# Patient Record
Sex: MALE | Race: WHITE | NOT HISPANIC OR LATINO | Employment: OTHER | URBAN - METROPOLITAN AREA
[De-identification: names, ages, dates, MRNs, and addresses within clinical notes are randomized per-mention and may not be internally consistent; named-entity substitution may affect disease eponyms.]

---

## 2017-02-17 ENCOUNTER — ALLSCRIPTS OFFICE VISIT (OUTPATIENT)
Dept: OTHER | Facility: OTHER | Age: 82
End: 2017-02-17

## 2017-07-05 ENCOUNTER — ALLSCRIPTS OFFICE VISIT (OUTPATIENT)
Dept: OTHER | Facility: OTHER | Age: 82
End: 2017-07-05

## 2017-07-17 ENCOUNTER — ALLSCRIPTS OFFICE VISIT (OUTPATIENT)
Dept: OTHER | Facility: OTHER | Age: 82
End: 2017-07-17

## 2017-07-17 ENCOUNTER — GENERIC CONVERSION - ENCOUNTER (OUTPATIENT)
Dept: OTHER | Facility: OTHER | Age: 82
End: 2017-07-17

## 2017-07-17 DIAGNOSIS — R42 DIZZINESS AND GIDDINESS: ICD-10-CM

## 2017-07-19 ENCOUNTER — APPOINTMENT (OUTPATIENT)
Dept: PHYSICAL THERAPY | Facility: CLINIC | Age: 82
End: 2017-07-19
Payer: COMMERCIAL

## 2017-07-19 DIAGNOSIS — R42 DIZZINESS AND GIDDINESS: ICD-10-CM

## 2017-07-19 PROCEDURE — 97162 PT EVAL MOD COMPLEX 30 MIN: CPT

## 2017-07-21 ENCOUNTER — APPOINTMENT (OUTPATIENT)
Dept: PHYSICAL THERAPY | Facility: CLINIC | Age: 82
End: 2017-07-21
Payer: COMMERCIAL

## 2017-07-21 PROCEDURE — 97112 NEUROMUSCULAR REEDUCATION: CPT

## 2017-07-24 ENCOUNTER — APPOINTMENT (OUTPATIENT)
Dept: PHYSICAL THERAPY | Facility: CLINIC | Age: 82
End: 2017-07-24
Payer: COMMERCIAL

## 2017-07-24 PROCEDURE — 97112 NEUROMUSCULAR REEDUCATION: CPT

## 2017-07-26 ENCOUNTER — ALLSCRIPTS OFFICE VISIT (OUTPATIENT)
Dept: OTHER | Facility: OTHER | Age: 82
End: 2017-07-26

## 2017-07-27 ENCOUNTER — APPOINTMENT (OUTPATIENT)
Dept: PHYSICAL THERAPY | Facility: CLINIC | Age: 82
End: 2017-07-27
Payer: COMMERCIAL

## 2017-07-27 PROCEDURE — 97112 NEUROMUSCULAR REEDUCATION: CPT

## 2017-08-01 ENCOUNTER — APPOINTMENT (OUTPATIENT)
Dept: PHYSICAL THERAPY | Facility: CLINIC | Age: 82
End: 2017-08-01
Payer: COMMERCIAL

## 2017-08-01 PROCEDURE — 97112 NEUROMUSCULAR REEDUCATION: CPT

## 2017-08-03 ENCOUNTER — APPOINTMENT (OUTPATIENT)
Dept: PHYSICAL THERAPY | Facility: CLINIC | Age: 82
End: 2017-08-03
Payer: COMMERCIAL

## 2017-08-03 PROCEDURE — 97112 NEUROMUSCULAR REEDUCATION: CPT

## 2017-08-08 ENCOUNTER — APPOINTMENT (OUTPATIENT)
Dept: PHYSICAL THERAPY | Facility: CLINIC | Age: 82
End: 2017-08-08
Payer: COMMERCIAL

## 2017-08-08 PROCEDURE — 97112 NEUROMUSCULAR REEDUCATION: CPT

## 2017-08-10 ENCOUNTER — APPOINTMENT (OUTPATIENT)
Dept: PHYSICAL THERAPY | Facility: CLINIC | Age: 82
End: 2017-08-10
Payer: COMMERCIAL

## 2017-08-10 PROCEDURE — 97112 NEUROMUSCULAR REEDUCATION: CPT

## 2017-08-25 ENCOUNTER — ALLSCRIPTS OFFICE VISIT (OUTPATIENT)
Dept: OTHER | Facility: OTHER | Age: 82
End: 2017-08-25

## 2017-09-07 ENCOUNTER — ALLSCRIPTS OFFICE VISIT (OUTPATIENT)
Dept: OTHER | Facility: OTHER | Age: 82
End: 2017-09-07

## 2017-09-07 ENCOUNTER — APPOINTMENT (OUTPATIENT)
Dept: AUDIOLOGY | Facility: CLINIC | Age: 82
End: 2017-09-07
Payer: COMMERCIAL

## 2017-09-07 PROCEDURE — 92567 TYMPANOMETRY: CPT | Performed by: AUDIOLOGIST

## 2017-09-07 PROCEDURE — 92557 COMPREHENSIVE HEARING TEST: CPT | Performed by: AUDIOLOGIST

## 2017-09-12 ENCOUNTER — ALLSCRIPTS OFFICE VISIT (OUTPATIENT)
Dept: OTHER | Facility: OTHER | Age: 82
End: 2017-09-12

## 2017-11-15 ENCOUNTER — ALLSCRIPTS OFFICE VISIT (OUTPATIENT)
Dept: OTHER | Facility: OTHER | Age: 82
End: 2017-11-15

## 2017-11-16 NOTE — PROGRESS NOTES
Assessment    1  Bilateral shoulder pain (719 41) (M25 511,M25 512)   2  Need for vaccination (V05 9) (Z23)    Plan  Bilateral shoulder pain    · Ketorolac Tromethamine 10 MG Oral Tablet; TAKE 1 TABLET 3 TIMES DAILY ASNEEDED FOR PAIN   · 1 - [facility]  *  Status: Active  Requested for:01Tnj8739  Care Summary provided  : Yes  Need for vaccination    · Fluzone High-Dose 0 5 ML Intramuscular Suspension Prefilled Syringe;INJECT 0 5  ML Intramuscular; To Be Done: 60GPS3033    Discussion/Summary    B/l shoulder pain seems like severe arthritis with possibly a component of bicipital tendonitis on the rt  i will treat with ketoralac and see if pt can get in with ortho  Chief Complaint  pt c/o shoulder pain in both arms the right for 3 days and the left started today  ac/cma      History of Present Illness  HPI: pt states three days ago his rt [place] started hurting   States the left one started last night  is 8/10 on the rt and the left is a 3-4/10  inciting event      Review of Systems   Constitutional: no fever or chills, feels well, no tiredness, no recent weight loss or weight gain  ENT: no complaints of earache, no loss of hearing, no nosebleeds or nasal discharge, no sore throat or hoarseness  Cardiovascular: no complaints of slow or fast heart rate, no chest pain, no palpitations, no leg claudication or lower extremity edema  Respiratory: no complaints of shortness of breath, no wheezing or cough, no dyspnea on exertion, no orthopnea or PND  Gastrointestinal: no complaints of abdominal pain, no constipation, no nausea or vomiting, no diarrhea or bloody stools  Musculoskeletal: arthralgias-- and-- joint stiffness, but-- as noted in HPI  Active Problems  1  Abnormal electrocardiogram (794 31) (R94 31)   2  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) (C61)   3  Arthritis of knee (716 96) (M17 10)   4  Backache (724 5) (M54 9)   5   Benign essential hypertension (401 1) (I10) 6  BMI 26 0-26 9,adult (V85 22) (Z68 26)   7  Body mass index (BMI) of 25 0 to 29 9 (278 02) (E66 3)   8  Bursitis (727 3) (M71 9)   9  Carcinoma of bladder (188 9) (C67 9)   10  Carpal tunnel syndrome 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) (G56 01)   11  Cervicalgia (723 1) (M54 2)   12  Chest pain (786 50) (R07 9)   13  Chronic obstructive pulmonary disease (496) (J44 9)   14  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)   15  Coronary arteriosclerosis (414 00) (I25 10)   16  Cubital tunnel syndrome on right (354 2) (G56 21)   17  Dizziness (780 4) (R42)   18  Drowsiness (780 09) (R40 0)   19  Dysfunction of right eustachian tube (381 81) (H69 81)   20  Ear popping (388 8) (H93 8X9)   21  Flu vaccine need (V04 81) (Z23)   22  Hypothyroidism (244 9) (E03 9)   23  Impingement Of Both Shoulders (726 2)   24  Insomnia (780 52) (G47 00)   25  Left knee pain (719 46) (M25 562)   26  Lung metastasis (197 0) (C78 00)   27  Malaise (780 79) (R53 81)   28  Malignant tumor of urinary bladder (188 9) (C67 9)   29  Medicare annual wellness visit, initial (V70 0) (Z00 00)   30  Medicare annual wellness visit, subsequent (V70 0) (Z00 00)   31  Metatarsalgia of right foot (726 70) (M77 41)   32  Myalgia And Myositis (729 1)   33  Need for zoster vaccination (V04 89) (Z23)   34  Nicotine dependence (305 1) (F17 200)   35  Obstructive sleep apnea (327 23) (G47 33)   36  Organic impotence (607 84) (N52 9)   37  Other headache syndrome (339 89) (G44 89)   38  Peripheral vascular disease (443 9) (I73 9)   39  Right elbow pain (719 42) (M25 521)   40  Scoliosis (737 30) (M41 9)   41  Screening for genitourinary condition (V81 6) (Z13 89)   42  Sebaceous cyst (706 2) (L72 3)   43  Sebaceous cyst (706 2) (L72 3)   44  Sensorineural hearing loss (SNHL) of both ears (389 18) (H90 3)   45  Status post angioplasty (V45 89) (Z98 62)   46  Tenosynovitis (727 00) (M65 9)   47  Ulnar neuritis (723 4) (G56 20)    Past Medical History  1   History of Acute pyelitis (590 10) (N10)   2  Acute upper respiratory infection (465 9) (J06 9)   3  Chronic laryngitis (476 0) (J37 0)   4  General medical examination (V70 9) (Z00 00)   5  History of acute sinusitis (V12 69) (Z87 09)   6  History of arthritis (V13 4) (Z87 39)   7  History of diarrhea (V12 79) (Z87 898)   8  History of dizziness (V13 89) (Z87 898)   9  History of hypertension (V12 59) (Z86 79)   10  History of malignant neoplasm (V10 90) (Z85 9)   11  History of myocardial infarction (412) (I25 2)   12  History of thyroid disorder (V12 29) (Z86 39)   13  History of upper respiratory infection (V12 09) (Z87 09)   14  History of Presence of stent in coronary artery (V45 82) (Z95 5)   15  Sprained Metatarsophalangeal Joint (845 12)   16  Tracheobronchitis (490) (J40)  Active Problems And Past Medical History Reviewed: The active problems and past medical history were reviewed and updated today  Family History  Sibling    1  Family history of arthritis (V17 7) (Z82 61)   2  Family history of cardiac disorder (V17 49) (Z82 49)   3  Family history of malignant neoplasm (V16 9) (Z80 9)  Brother    4  Family history of malignant neoplasm (V16 9) (Z80 9)  Family History    5  Family history of arthritis (V17 7) (Z82 61)   6  Family history of cardiac disorder (V17 49) (Z82 49)   7  Family history of malignant neoplasm (V16 9) (Z80 9)  Family History Reviewed: The family history was reviewed and updated today  Social History   · Being A Social Drinker   · Has 1 child   ·    · Occupation: Retired   · Rarely consumes alcohol (V49 89) (Z78 9)   · Recently Stopping Smoking (V15 82)   · Retired  The social history was reviewed and updated today  Surgical History    1  History of Ileostomy   2  History of Lung Surgery  Surgical History Reviewed: The surgical history was reviewed and updated today  Current Meds   1   Albuterol Sulfate (2 5 MG/3ML) 0 083% Inhalation Nebulization Solution; USE 1 UNIT DOSE VIA NEBULIZER  4 TIMES A DAY AS NEEDED; Therapy: 34AZB1265 to (Last Rx:05Mar2014)  Requested for: 55KXJ6413 Ordered   2  Ipratropium-Albuterol 0 5-2 5 (3) MG/3ML Inhalation Solution; use TID as directed prn; Therapy: 83TZW6785 to (Last Rx:05Mar2014)  Requested for: 47TYB9312 Ordered   3  Levothyroxine Sodium 150 MCG Oral Tablet; TAKE ONE TABLET BY MOUTH ONCE DAILY; Therapy: 50Ebt7335 to (Lesta Mortimer)  Requested for: 70ODA0588; Last Rx:31Qib0258 Ordered   4  Losartan Potassium 50 MG Oral Tablet; 1/2 tablet daily as per Dr Brea Perez; Therapy: (Recorded:06Kme0331) to Recorded   5  Lumigan 0 03 % SOLN; 1 gtt in each eye (2 5 ml); Therapy: 97AQR7521 to  Requested for: 00JQA8763 Recorded   6  Meclizine HCl - 12 5 MG Oral Tablet; TAKE 1 TABLET 3 TIMES DAILY AS NEEDED; Therapy: 60GNZ8813 to (Evaluate:63Imd4124)  Requested for: 89UFD6406; Last Rx:17Fmm6720 Ordered   7  Multivitamins TABS; 1 Every Day; Therapy: 07RMU7764 to  Requested for: 75XRR9288 Recorded   8  Simvastatin 40 MG Oral Tablet; TAKE ONE TABLET BY MOUTH ONCE DAILY; Therapy: 16SOG7405 to (Lesta Mortimer)  Requested for: 99RPW2678; Last Rx:69Nrn9549 Ordered   9  Spiriva HandiHaler 18 MCG Inhalation Capsule; as directed; Therapy: 33Rvp7566 to  Requested for: 27ZID6299 Recorded   10  Toprol XL 50 MG Oral Tablet Extended Release 24 Hour; 1 every day; Therapy: 04RHP3436 to  Requested for: 43SJU3910 Recorded   11  Vitamin C 1000 MG Oral Tablet; 1 Every Day; Therapy: 76UTK0976 to  Requested for: 36IJT7928 Recorded    The medication list was reviewed and updated today  Allergies  1  CORTISONE   2  Penicillins   3  Darvon CAPS    Vitals   Recorded: 01VJP2052 02:51PM   Temperature 95 8 F   Heart Rate 82   Respiration 20   Systolic 176   Diastolic 70   Height 5 ft 6 in   Weight 172 lb    BMI Calculated 27 76   BSA Calculated 1 88       Physical Exam   Constitutional  General appearance: No acute distress, well appearing and well nourished  Eyes  Conjunctiva and lids: No swelling, erythema, or discharge  Pupils and irises: Equal, round and reactive to light  Ears, Nose, Mouth, and Throat  External inspection of ears and nose: Normal    Otoscopic examination: Tympanic membrance translucent with normal light reflex  Canals patent without erythema  Pulmonary  Auscultation of lungs: Clear to auscultation, equal breath sounds bilaterally, no wheezes, no rales, no rhonci  Cardiovascular  Auscultation of heart: Normal rate and rhythm, normal S1 and S2, without murmurs  Abdomen  Abdomen: Non-tender, no masses  Lymphatic  Palpation of lymph nodes in neck: No lymphadenopathy  Musculoskeletal  Gait and station: Normal    Inspection/palpation of joints, bones, and muscles: Abnormal  -- creps in shoulder b/l rt worse than left, pt is tender on the rt over bicipital tendon  rom on rt is very poor          Signatures   Electronically signed by : Catarina Bloch, DO; Nov 15 2017  3:05PM EST                       (Author)

## 2017-11-21 ENCOUNTER — GENERIC CONVERSION - ENCOUNTER (OUTPATIENT)
Dept: OTHER | Facility: OTHER | Age: 82
End: 2017-11-21

## 2017-11-30 ENCOUNTER — GENERIC CONVERSION - ENCOUNTER (OUTPATIENT)
Dept: FAMILY MEDICINE CLINIC | Facility: CLINIC | Age: 82
End: 2017-11-30

## 2017-12-01 ENCOUNTER — GENERIC CONVERSION - ENCOUNTER (OUTPATIENT)
Dept: FAMILY MEDICINE CLINIC | Facility: CLINIC | Age: 82
End: 2017-12-01

## 2017-12-02 ENCOUNTER — GENERIC CONVERSION - ENCOUNTER (OUTPATIENT)
Dept: FAMILY MEDICINE CLINIC | Facility: CLINIC | Age: 82
End: 2017-12-02

## 2017-12-08 ENCOUNTER — GENERIC CONVERSION - ENCOUNTER (OUTPATIENT)
Dept: OTHER | Facility: OTHER | Age: 82
End: 2017-12-08

## 2017-12-12 ENCOUNTER — GENERIC CONVERSION - ENCOUNTER (OUTPATIENT)
Dept: OTHER | Facility: OTHER | Age: 82
End: 2017-12-12

## 2018-01-10 NOTE — RESULT NOTES
Verified Results  (1) COMPREHENSIVE METABOLIC PANEL 89KXP0305 48:74KF Vipul Acosta     Test Name Result Flag Reference   Glucose, Serum 97 mg/dL  65-99   BUN 22 mg/dL  8-27   Creatinine, Serum 1 16 mg/dL  0 76-1 27   eGFR If NonAfricn Am 58 mL/min/1 73 L >59   eGFR If Africn Am 67 mL/min/1 73  >59   BUN/Creatinine Ratio 19  10-22   Sodium, Serum 142 mmol/L  134-144   Potassium, Serum 4 5 mmol/L  3 5-5 2   Chloride, Serum 101 mmol/L     Carbon Dioxide, Total 24 mmol/L  18-29   Calcium, Serum 9 6 mg/dL  8 6-10 2   Protein, Total, Serum 6 4 g/dL  6 0-8 5   Albumin, Serum 4 1 g/dL  3 5-4 7   Globulin, Total 2 3 g/dL  1 5-4 5   A/G Ratio 1 8  1 1-2 5   Bilirubin, Total 0 6 mg/dL  0 0-1 2   Alkaline Phosphatase, S 57 IU/L     AST (SGOT) 30 IU/L  0-40   ALT (SGPT) 16 IU/L  0-44     (1) LIPID PANEL FASTING W DIRECT LDL REFLEX 38VSS0165 07:07AM Vipul Acosta     Test Name Result Flag Reference   Cholesterol, Total 192 mg/dL  100-199   Triglycerides 178 mg/dL H 0-149   HDL Cholesterol 49 mg/dL  >39   According to ATP-III Guidelines, HDL-C >59 mg/dL is considered a  negative risk factor for CHD  LDL Cholesterol Calc 107 mg/dL H 0-99     (1) TSH 22ABB1539 07:07AM Robinsonmary alice Vipul Atkinson     Test Name Result Flag Reference   TSH 1 750 uIU/mL  0 450-4 500     [facility]) Cardiovascular Risk Assessment 60OJM6614 07:07AM Karla Vipul Demetrio     Test Name Result Flag Reference   Interpretation NTAP     PDF Image Not applicable       [facility]) [address] [place] [address] Blvd CKD Program 57SRM2256 07:07AM Robinsonmonjanette Vipul Atkinson     Test Name Result Flag Reference   Interpretation Note     -------------------------------  CHRONIC KIDNEY DISEASE:  EGFR, BLOOD PRESSURE, AND PROTEINURIA ASSESSMENT  Patient's eGFR was previously outside the scope of the program and now  is 62 mL/min/1 73mE2 corresponding to CKD stage 3a  Multiply eGFR by  1 159 if patient is   The regression of eGFR with time  is not statistically significant   Current eGFR is 58 mL/min/1 73mE2  corresponding to CKD stage 3a  Potassium is within goal and has not  changed significantly, was 4 8 and now is 4 5 mmol/L  EGFR, BLOOD PRESSURE, AND PROTEINURIA TREATMENT SUGGESTIONS  -  Given patient's advanced age, we are unable to provide specific blood  pressure treatment suggestions  Individualize blood pressure goals  based on the patient's comorbidities and to avoid orthostatic  hypotension and other medication side effects  Assessment of  albuminuria (urine albumin:creatinine ratio or urine  protein:creatinine ratio preferred) is recommended at least annually  in CKD patients for staging and disease prognosis  EGFR, BLOOD PRESSURE, AND PROTEINURIA FOLLOW-UP  -  fasting Renal Panel within 12 months; Spot Urine Panel is recommended  by KDOQI guidelines, at least yearly;  -  BONE and MINERAL ASSESSMENT  Calcium is within goal and has not changed significantly, was 9 4 and  now is 9 6 mg/dL  Carbon Dioxide is within goal and has not changed  significantly, was 25 and now is 24 mmol/L  KDOQI guidelines recommend  the measurement of 25-hydroxy vitamin D in patients with CKD  BONE and MINERAL TREATMENT SUGGESTIONS  -  Interpretations require simultaneous measurements of serum calcium and  phosphorus  BONE and MINERAL FOLLOW-UP  -  fasting PTH with Renal Panel and 25-Hydroxy Vitamin D are recommended  by KDOQI guidelines, at least yearly;  -  LIPIDS ASSESSMENT  LDL-C is borderline high and has not changed significantly, was 105  and now is 107 mg/dL  Triglyceride is borderline high and has not  changed significantly, was 179 and now is 178 mg/dL  Non-HDL  Cholesterol is borderline high and has not changed significantly, was  141 and now is 143 mg/dL  HDL-C is normal and has not changed  significantly, was 53 and now is 49 mg/dL  LIPIDS TREATMENT SUGGESTIONS  -  Therapeutic lifestyle changes are always valuable to maintain optimal  blood lipid status (diet, exercise, weight management)   Begin statin  If statin already in use, consider increasing dose to achieve at least  a 50% LDL reduction from baseline  Moderate or high intensity statin  is preferred  If statin cannot be tolerated or increased, alternatives  include use of an intestinal agent (ezetimibe or bile acid  sequestrant), niacin, and/or fish oil  LIPIDS FOLLOW-UP  -  fasting Lipid Panel within 3 months;  -  ANEMIA ASSESSMENT  Most recent order does not include a CBC Panel or iron studies  ANEMIA FOLLOW-UP  -  CBC is recommended by KDOQI guidelines, at least yearly;  -------------------------------  DISCLAIMER  These assessments and treatment suggestions are provided as a  convenience in support of the physician-patient relationship and are  not intended to replace the physician's clinical judgment  They are  derived from the national guidelines in addition to other evidence and  expert opinion  The clinician should consider this information within  the context of clinical opinion and the individual patient  SEE GUIDANCE FOR CHRONIC KIDNEY DISEASE PROGRAM: National Kidney  Foundation Kidney Disease Outcomes Quality Initiative (KDOQI (TM)),  with its limitations and disclaimers, are at  www kidney  org/professionals/KDOQI  Kidney Disease Improving Global  Outcomes (KDIGO) clinical practice guidelines are at  http://kdigo  org/home/guidelines/  The members of Sachin Gallo national  advisory panel are listed at www  [employer] com  This program is  intended for patients who have been diagnosed with stages 3, 4, or  pre-dialysis 5 CKD  It is not intended for children, pregnant  patients, or transplant patients  PDF Image

## 2018-01-11 NOTE — RESULT NOTES
Verified Results  (1) TSH 36NWG4416 10:58AM Tawny Acosta Come     Test Name Result Flag Reference   TSH 2 510 uIU/mL  0 450-4 500       Discussion/Summary   Thyroid stable range   continue medications

## 2018-01-12 VITALS
DIASTOLIC BLOOD PRESSURE: 72 MMHG | TEMPERATURE: 96.8 F | BODY MASS INDEX: 27.16 KG/M2 | HEART RATE: 84 BPM | RESPIRATION RATE: 16 BRPM | SYSTOLIC BLOOD PRESSURE: 126 MMHG | HEIGHT: 66 IN | WEIGHT: 169 LBS

## 2018-01-12 VITALS
BODY MASS INDEX: 25.39 KG/M2 | WEIGHT: 158 LBS | SYSTOLIC BLOOD PRESSURE: 110 MMHG | RESPIRATION RATE: 14 BRPM | TEMPERATURE: 98 F | DIASTOLIC BLOOD PRESSURE: 80 MMHG | HEART RATE: 80 BPM | HEIGHT: 66 IN

## 2018-01-12 NOTE — RESULT NOTES
Verified Results  *VB - Urinary Incontinence Screen (Dx Z13 89 Screen for UI) 55Efm7200 09:57AM Janette Acosta Pouch     Test Name Result Flag Reference   Urinary Incontinence Assessment 86Unx5283         Plan  Screening for genitourinary condition    · *VB - Urinary Incontinence Screen (Dx Z13 89 Screen for UI);  Status:Complete;   Done:  62AZB5600 09:57AM

## 2018-01-13 VITALS
TEMPERATURE: 95.8 F | HEIGHT: 66 IN | RESPIRATION RATE: 20 BRPM | HEART RATE: 82 BPM | WEIGHT: 172 LBS | DIASTOLIC BLOOD PRESSURE: 70 MMHG | BODY MASS INDEX: 27.64 KG/M2 | SYSTOLIC BLOOD PRESSURE: 138 MMHG

## 2018-01-13 VITALS
RESPIRATION RATE: 16 BRPM | SYSTOLIC BLOOD PRESSURE: 120 MMHG | HEIGHT: 66 IN | HEART RATE: 76 BPM | BODY MASS INDEX: 26.68 KG/M2 | TEMPERATURE: 97.8 F | WEIGHT: 166 LBS | DIASTOLIC BLOOD PRESSURE: 78 MMHG

## 2018-01-13 VITALS
TEMPERATURE: 96.8 F | WEIGHT: 168 LBS | SYSTOLIC BLOOD PRESSURE: 118 MMHG | RESPIRATION RATE: 16 BRPM | BODY MASS INDEX: 27 KG/M2 | HEART RATE: 84 BPM | HEIGHT: 66 IN | DIASTOLIC BLOOD PRESSURE: 74 MMHG

## 2018-01-13 NOTE — PROGRESS NOTES
Assessment    1  Medicare annual wellness visit, subsequent (V70 0) (Z00 00)   2  Benign essential hypertension (401 1) (I10)   3  Dizziness (780 4) (R42)   4  Dysfunction of right eustachian tube (381 81) (H69 81)    Plan  Medicare annual wellness visit, subsequent    · It is important that you drink enough fluids to stay healthy ; Status:Complete;   Done:  49AEX9483   · There are many exercise options for seniors ; Status:Complete;   Done: 35IYA7678   · These are things you can do to prevent falls in and around the home ; Status:Complete;    Done: 41VDO5581   · We recommend a colonoscopy ; Status:Complete;   Done: 39PMP7835   · We recommend routine visits to a dentist ; Status:Complete;   Done: 29LJE5802   · We recommend that you create an advance directive ; Status:Complete;   Done:  12UTQ4840   · We recommend that you follow these steps to lower your risk of osteoporosis  ;  Status:Complete;   Done: 72ZEG2002  Screening for genitourinary condition    · *VB - Urinary Incontinence Screen (Dx Z13 89 Screen for UI); Status:Complete;   Done:  50XAA4398 09:57AM    Discussion/Summary    Continue flonase  continue medications f/u again balance  f/u cardilogy  Impression: Subsequent Annual Wellness Visit  Cardiovascular screening and counseling: the risks and benefits of screening were discussed  Diabetes screening and counseling: the risks and benefits of screening were discussed  Prostate cancer screening and counseling: the risks and benefits of screening were discussed  Osteoporosis screening and counseling: the risks and benefits of screening were discussed  Glaucoma screening and counseling: the risks and benefits of screening were discussed  HIV screening and counseling: the risks and benefits of screening were discussed  Chief Complaint  Seen for subsequent AWV  er/cma  Advance Directives  Advance Directive [facility]:   The patient has a living will located  in patient's home  History of Present Illness  Welcome to Medicare and Wellness Visits: The patient is being seen for the subsequent annual wellness visit  Medicare Screening and Risk Factors   Hospitalizations: no previous hospitalizations  Medicare Screening Tests Risk Questions   Drug and Alcohol Use: The patient is a current cigarette smoker  The patient reports never drinking alcohol  Alcohol concern:   The patient has no concerns about alcohol abuse  He has never used illicit drugs  Diet and Physical Activity: Current diet includes well balanced meals and limited junk food  He exercises infrequently  Exercise: walking 20 minutes per day  Mood Disorder and Cognitive Impairment Screening: PHQ-9 Depression Scale   Over the past 2 weeks, how often have you been bothered by the following problems? 1 ) Little interest or pleasure in doing things? Not at all    2 ) Feeling down, depressed or hopeless? Not at all  TOTAL SCORE: 0    How difficult have these problems made it for you to do your work, take care of things at home, or get along with people? Not at all  He denies feeling down, depressed, or hopeless over the past two weeks  He denies feeling little interest or pleasure in doing things over the past two weeks  Cognitive impairment screening: difficulty learning/retaining new information, denies difficulty handling complex tasks, denies difficulty with reasoning, denies difficulty with spatial ability and orientation, denies difficulty with language and denies difficulty with behavior  Functional Ability/Level of Safety: Hearing is slightly decreased in the right ear and slightly decreased in the left ear  He reports hearing difficulties  He does not use a hearing aid   Activities of daily living details: does not need help using the phone, no transportation help needed, does not need help shopping, no meal preparation help needed, does not need help doing housework, does not need help doing laundry, does not need help managing medications and does not need help managing money  Injury History: no alcohol use, no antidepressant use, no sedative use and no previous fall  Home safety risk factors:  no grab bars in the bathroom, but no unfamiliar surroundings, no loose rugs, no poor household lighting, no uneven floors, no household clutter and handrails on the stairs  Advance Directives: Advance directives: living will and durable power of  for health care directives, but no advance directives  Co-Managers and Medical Equipment/Suppliers: See Patient Care Team   Preventive Quality Program 65 and Older: Falls Risk: The patient fell 0 times in the past 12 months  Symptoms Include: impaired balance    The patient currently has no urinary incontinence symptoms  Patient Care Team    Care Team Member Role Specialty Office Number   Tashia UMANA LYNN  Otolaryngology [phone]     Review of Systems    Constitutional: no chills and no fatigue  Eyes: no watery discharge from the eyes  ENT: no nasal congestion  Cardiovascular: no chest pain and no palpitations  Respiratory: not sleeping upright or with extra pillows  Gastrointestinal: able to pass flatus  Active Problems    1  Abnormal electrocardiogram (794 31) (R94 31)   2  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) (C61)   3  Arthritis of knee (716 96) (M17 10)   4  Backache (724 5) (M54 9)   5  Benign essential hypertension (401 1) (I10)   6  BMI 26 0-26 9,adult (V85 22) (Z68 26)   7  Body mass index (BMI) of 25 0 to 29 9 (278 02) (E66 3)   8  Bursitis (727 3) (M71 9)   9  Carcinoma of bladder (188 9) (C67 9)   10  Carpal tunnel syndrome 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) (G56 01)   11  Cervicalgia (723 1) (M54 2)   12  Chest pain (786 50) (R07 9)   13  Chronic obstructive pulmonary disease (496) (J44 9)   14  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)   15  Coronary arteriosclerosis (414 00) (I25 10)   16   Cubital tunnel syndrome on right (354  2) (G56 21)   17  Dizziness (780 4) (R42)   18  Drowsiness (780 09) (R40 0)   19  Dysfunction of right eustachian tube (381 81) (H69 81)   20  Ear popping (388 8) (H93 8X9)   21  Flu vaccine need (V04 81) (Z23)   22  Hypothyroidism (244 9) (E03 9)   23  Impingement Of Both Shoulders (726 2)   24  Insomnia (780 52) (G47 00)   25  Left knee pain (719 46) (M25 562)   26  Lung metastasis (197 0) (C78 00)   27  Malaise (780 79) (R53 81)   28  Malignant tumor of urinary bladder (188 9) (C67 9)   29  Medicare annual wellness visit, initial (V70 0) (Z00 00)   30  Metatarsalgia of right foot (726 70) (M77 41)   31  Myalgia And Myositis (729 1)   32  Need for zoster vaccination (V04 89) (Z23)   33  Nicotine dependence (305 1) (F17 200)   34  Obstructive sleep apnea (327 23) (G47 33)   35  Organic impotence (607 84) (N52 9)   36  Other headache syndrome (339 89) (G44 89)   37  Peripheral vascular disease (443 9) (I73 9)   38  Right elbow pain (719 42) (M25 521)   39  Scoliosis (737 30) (M41 9)   40  Screening for genitourinary condition (V81 6) (Z13 89)   41  Sebaceous cyst (706 2) (L72 3)   42  Sebaceous cyst (706 2) (L72 3)   43  Sensorineural hearing loss (SNHL) of both ears (389 18) (H90 3)   44  Status post angioplasty (V45 89) (Z98 62)   45  Tenosynovitis (727 00) (M65 9)   46   Ulnar neuritis (723 4) (G56 20)    Past Medical History    · History of Acute pyelitis (590 10) (N10)   · Acute upper respiratory infection (465 9) (J06 9)   · Chronic laryngitis (476 0) (J37 0)   · General medical examination (V70 9) (Z00 00)   · History of acute sinusitis (V12 69) (Z87 09)   · History of arthritis (V13 4) (Z87 39)   · History of diarrhea (V12 79) (F70 135)   · History of dizziness (V13 89) (C63 302)   · History of hypertension (V12 59) (Z86 79)   · History of malignant neoplasm (V10 90) (Z85 9)   · History of myocardial infarction (412) (I25 2)   · History of thyroid disorder (V12 29) (Z86 39)   · History of upper respiratory infection (V12 09) (Z87 09)   · History of Presence of stent in coronary artery (V45 82) (Z95 5)   · Sprained Metatarsophalangeal Joint (845 12)   · Tracheobronchitis (490) (J40)    Surgical History    · History of Ileostomy   · History of Lung Surgery    The surgical history was reviewed and updated today  Family History  Sibling    · Family history of arthritis (V17 7) (Z82 61)   · Family history of cardiac disorder (V17 49) (Z82 49)   · Family history of malignant neoplasm (V16 9) (Z80 9)  Brother    · Family history of malignant neoplasm (V16 9) (Z80 9)  Family History    · Family history of arthritis (V17 7) (Z82 61)   · Family history of cardiac disorder (V17 49) (Z82 49)   · Family history of malignant neoplasm (V16 9) (Z80 9)    The family history was reviewed and updated today  Social History    · Being A Social Drinker   · Has 1 child   ·    · Occupation: Retired   · Rarely consumes alcohol (V49 89) (Z78 9)   · Recently Stopping Smoking (V15 82)   · Retired  The social history was reviewed and updated today  The social history was reviewed and is unchanged  Current Meds   1  Albuterol Sulfate (2 5 MG/3ML) 0 083% Inhalation Nebulization Solution; USE 1 UNIT   DOSE VIA NEBULIZER  4 TIMES A DAY AS NEEDED; Therapy: 34ZAW6547 to (Last Rx:05Mar2014)  Requested for: 88PKT0736 Ordered   2  Ipratropium-Albuterol 0 5-2 5 (3) MG/3ML Inhalation Solution; use TID as directed prn; Therapy: 32WDJ6772 to (Last Rx:05Mar2014)  Requested for: 67CQZ5010 Ordered   3  Levothyroxine Sodium 150 MCG Oral Tablet; TAKE ONE TABLET BY MOUTH ONCE   DAILY; Therapy: 76Wow9714 to (Hero Gomez)  Requested for: 97CXG2800; Last   Rx:45Vxf9878 Ordered   4  Losartan Potassium 50 MG Oral Tablet; 1/2 tablet daily as per Dr Jennifer Lucio; Therapy: (Recorded:47Pbu7507) to Recorded   5  Lumigan 0 03 % SOLN; 1 gtt in each eye (2 5 ml); Therapy: 24WXB7117 to  Requested for: 47KEM6517 Recorded   6  Meclizine HCl - 12 5 MG Oral Tablet; TAKE 1 TABLET 3 TIMES DAILY AS NEEDED; Therapy: 72UBF6704 to (Evaluate:37Qhn0796)  Requested for: 01AQA5477; Last   Rx:87Vth5195 Ordered   7  Multivitamins TABS; 1 Every Day; Therapy: 64POD5283 to  Requested for: 17VZD8161 Recorded   8  Simvastatin 40 MG Oral Tablet; TAKE ONE TABLET BY MOUTH ONCE DAILY; Therapy: 08GMP6073 to (Precilla Paling)  Requested for: 02BSV1290; Last   Rx:32Wtu4585 Ordered   9  Spiriva HandiHaler 18 MCG Inhalation Capsule; as directed; Therapy: 29Tfd5502 to  Requested for: 41GMY2674 Recorded   10  Toprol XL 50 MG Oral Tablet Extended Release 24 Hour; 1 every day; Therapy: 88OHC4968 to  Requested for: 54AFY9263 Recorded   11  Vitamin C 1000 MG Oral Tablet; 1 Every Day; Therapy: 38NJR8670 to  Requested for: 38RQH2089 Recorded    The medication list was reviewed and updated today  Allergies    1  CORTISONE   2  Penicillins   3  Darvon CAPS    Immunizations   ** Printed in Appendix #1 below  Vitals  Signs    Temperature: 96 4 F  Heart Rate: 76  Respiration: 16  Systolic: 543  Diastolic: 64  Height: 5 ft 6 in  Weight: 169 lb   BMI Calculated: 27 28  BSA Calculated: 1 86    Physical Exam    Constitutional   General appearance: No acute distress, well appearing and well nourished  Eyes   Conjunctiva and lids: No swelling, erythema, or discharge  Pupils and irises: Equal, round and reactive to light  Ears, Nose, Mouth, and Throat   External inspection of ears and nose: Normal     Otoscopic examination: Abnormal   The right tympanic membrane was bulging and had decreased mobility  The left tympanic membrane was not bulging  Dull  Nasal mucosa, septum, and turbinates: Abnormal   The bilateral nasal mucosa was red  Oropharynx: Abnormal   The posterior pharynx was erythematous  Pulmonary   Respiratory effort: No increased work of breathing or signs of respiratory distress  ronchi     Cardiovascular   Auscultation of heart: Normal rate and rhythm, normal S1 and S2, without murmurs  Examination of extremities for edema and/or varicosities: Normal     Abdomen   Abdomen: Non-tender, no masses  Liver and spleen: No hepatomegaly or splenomegaly  Lymphatic   Palpation of lymph nodes in neck: No lymphadenopathy  Musculoskeletal   Gait and station: Normal     Digits and nails: Normal without clubbing or cyanosis  Skin ecchymosis arms  Neurologic   Cranial nerves: Cranial nerves 2-12 intact  Sensation: No sensory loss  Psychiatric   Orientation to person, place and time: Normal     Mood and affect: Normal          Results/Data  *VB - Urinary Incontinence Screen (Dx Z13 89 Screen for UI) 00Uhz6184 09:57AM Elva Acosta Batters     Test Name Result Flag Reference   Urinary Incontinence Assessment 52Evf4090       Falls Risk Assessment (Dx Z13 89 Screen for Neurologic Disorder) 87Poy5792 09:56AM User, Ahs     Test Name Result Flag Reference   Falls Risk      No falls in the past year     PHQ-2 Adult Depression Screening 03Rlo8130 09:56AM User, Ahs     Test Name Result Flag Reference   PHQ-2 Adult Depression Score 0     Over the last two weeks, how often have you been bothered by any of the following problems?   Little interest or pleasure in doing things: Not at all - 0  Feeling down, depressed, or hopeless: Not at all - 0   PHQ-2 Adult Depression Screening Negative         Signatures   Electronically signed by : Dania Gill MD; Sep 12 2017 10:07AM EST                       (Author)    Appendix #1     Patient: Mike Curiel; : 1933; MRN: [id number]      1 2 3 4 5 6    Influenza  13-Oct-1997 15-Nov-2001 19-Nov-2002 23-Oct-2003 19-Oct-2005 17-Nov-2006    PCV  23-Sep-2016         PPSV  13-Cal-2000 19-Oct-2005        Td/DT  07-Sep-2000

## 2018-01-14 VITALS
RESPIRATION RATE: 16 BRPM | SYSTOLIC BLOOD PRESSURE: 126 MMHG | TEMPERATURE: 98.4 F | HEIGHT: 66 IN | WEIGHT: 167 LBS | DIASTOLIC BLOOD PRESSURE: 70 MMHG | BODY MASS INDEX: 26.84 KG/M2 | HEART RATE: 74 BPM

## 2018-01-14 VITALS
DIASTOLIC BLOOD PRESSURE: 64 MMHG | SYSTOLIC BLOOD PRESSURE: 112 MMHG | BODY MASS INDEX: 27.16 KG/M2 | HEART RATE: 76 BPM | TEMPERATURE: 96.4 F | RESPIRATION RATE: 16 BRPM | HEIGHT: 66 IN | WEIGHT: 169 LBS

## 2018-01-14 NOTE — PROGRESS NOTES
Assessment   1  Medicare annual wellness visit, initial (V70 0) (Z00 00)  2  Bladder cancer (188 9) (C67 9)  3  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) (C61)  4  Chronic obstructive pulmonary disease (496) (J44 9)  5  Body mass index (BMI) of 25 0 to 29 9 (278 02) (E66 3)    Plan  Abnormal electrocardiogram, Benign essential hypertension, Chronic obstructive  pulmonary disease, Hypothyroidism, Lung metastasis, Obstructive sleep apnea    · (1) COMPREHENSIVE METABOLIC PANEL; Status:Active; Requested for:21Oct2016;    · (1) LIPID PANEL, FASTING; Status:Active; Requested for:21Oct2016;    · (1) TSH; Status:Active; Requested for:21Oct2016;   Benign essential hypertension, Chronic obstructive pulmonary disease, Myalgia And  Myositis    · Follow-up visit in 4 Months Evaluation and Treatment  Follow-up  Status: Hold For -  Scheduling  Requested for: 21Jun2016  Chronic obstructive pulmonary disease    · 3 times a day practice pursed lip and abdominal breathing ; Status:Complete;   Done:  79ZVF0344   · Avoid exposure to household dust, animal dander, and molds ; Status:Complete;   Done:  30OXD0798   · Continue with our present treatment plan ; Status:Complete;   Done: 52QUT6988   · Use your oxygen at 2 liters per minute 12 hours a day ; Status:Complete;   Done:  51XJH0465   · Call [phone] if:  The cough is worse or secretions become darker or colored ;  Status:Complete;   Done: 26ZQH6104   · Call [phone] if: Your temperature is higher than 102F ; Status:Complete;   Done:  18KSI9282   · Call 911 if: You are too short of breath to talk, you can speak only one or two words  between breaths, or your lips or nails look blue ; Status:Complete;   Done: 02WFQ5640   · Seek Immediate Medical Attention if: You feel short of breath even while resting ;  Status:Complete;   Done: 33JTZ0145   · Seek Immediate Medical Attention if: You have pain in the chest that gets worse with  deep breathing or coughing ; Status:Complete;   Done: 16GGW7208  Medicare annual wellness visit, initial    · It is important that you drink enough fluids to stay healthy ; Status:Complete;   Done:  87LRZ5652   · There are many exercise options for seniors ; Status:Complete;   Done: 18BYN5144   · These are things you can do to prevent falls in and around the home ; Status:Complete;    Done: 65DBB9530   · We recommend a colonoscopy ; Status:Complete;   Done: 74OUV7771   · We recommend routine visits to a dentist ; Status:Complete;   Done: 68TDF4532   · We recommend that you create an advance directive ; Status:Complete;   Done:  23XNJ4268   · We recommend that you follow these steps to lower your risk of osteoporosis  ;  Status:Complete;   Done: 63FZC9182  Screening for genitourinary condition    · *VB-Urinary Incontinence Screen (Dx V81 6 Screen for UI); Status:Complete -  Retrospective By Protocol Authorization;   Done: 69INK2158 09:49AM    Discussion/Summary    Robitussin 2 tsp twice daily, Hydrate well  Tea/honey daily f/u Pulm for PFT need copy1    Impression: Initial Annual Wellness Visit  Cardiovascular screening and counseling: the risks and benefits of screening were discussed  Diabetes screening and counseling: the risks and benefits of screening were discussed  Colorectal cancer screening and counseling: the risks and benefits of screening were discussed  Osteoporosis screening and counseling: the risks and benefits of screening were discussed  HIV screening and counseling: the risks and benefits of screening were discussed  Advance Directive Planning: not complete  Patient Discussion: plan discussed with the patient  1 Amended By: Mary Alice Moon; Jun 21 2016 9:56 AM EST    Chief Complaint  Seen for a f/u on HTN and AWV  er/cma  PFT at [facility] need copy      History of Present Illness  AWV and F/u chronic illness PHM   The patient is being seen for the initial annual wellness visit     Medicare Screening and Risk Factors   Hospitalizations: no recent  Medicare Screening Tests Risk Questions   Abdominal aortic aneurysm risk assessment: tobacco use and over 72years of age  Osteoporosis risk assessment: over 48years of age  HIV risk assessment: none indicated  Drug and Alcohol Use: The patient is a former cigarette smoker  The patient reports occasional alcohol use  Alcohol concern:   The patient has no concerns about alcohol abuse  He has never used illicit drugs  Diet and Physical Activity: Current diet includes well balanced meals, 1-2 servings of fruit per day, 1 servings of meat per day, 2 servings of whole grains per day, 2 servings of simple carbohydrates per day and 1-2 servings of dairy products per day  He exercises infrequently  Exercise: walking <30 minutes per day  Mood Disorder and Cognitive Impairment Screening: PHQ-9 Depression Scale He denies feeling down, depressed, or hopeless over the past two weeks  He denies feeling little interest or pleasure in doing things over the past two weeks  Cognitive impairment screening: denies difficulty learning/retaining new information, denies difficulty handling complex tasks, denies difficulty with reasoning, denies difficulty with spatial ability and orientation, denies difficulty with language and denies difficulty with behavior  Functional Ability/Level of Safety: Hearing is normal bilaterally and a hearing aid is not used  The patient is currently able to do activities of daily living without limitations, able to do instrumental activities of daily living without limitations, able to participate in social activities without limitations and able to drive without limitations   Activities of daily living details: does not need help using the phone, no transportation help needed, does not need help shopping, no meal preparation help needed, does not need help doing housework, does not need help doing laundry, does not need help managing medications and does not need help managing money  Fall risk factors:  no alcohol use, no mobility impairment, no deconditioning, no postural hypotension, no visual impairment, no urinary incontinence, no cognitive impairment and no previous fall  Home safety risk factors:  no unfamiliar surroundings, no loose rugs, no poor household lighting, no uneven floors, no household clutter and handrails on the stairs  Advance Directives: Concerns with the patient's end of life decisions: 5 wishes information given  Co-Managers and Medical Equipment/Suppliers: See Patient Care Team     The patient currently has no urinary incontinence symptoms  Active Problems   1  Abnormal electrocardiogram (794 31) (R94 31)  2  Acute pyelitis (590 10) (N10)  3  Acute sinusitis (461 9) (J01 90)  4  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) (C61)  5  Arthritis of knee (716 96) (M19 90)  6  Backache (724 5) (M54 9)  7  Benign essential hypertension (401 1) (I10)  8  Bladder cancer (188 9) (C67 9)  9  Bursitis (727 3) (M71 9)  10  Carcinoma of bladder (188 9) (C67 9)  11  Carpal tunnel syndrome 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) (G56 01)  12  Cervicalgia (723 1) (M54 2)  13  Chest pain (786 50) (R07 9)  14  Chronic obstructive pulmonary disease (496) (J44 9)  15  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)  16  Cubital tunnel syndrome on right (354 2) (G56 21)  17  Diarrhea (787 91) (R19 7)  18  Dizziness (780 4) (R42)  19  Drowsiness (780 09) (R40 0)  20  Flu vaccine need (V04 81) (Z23)  21  Hypothyroidism (244 9) (E03 9)  22  Impingement Of Both Shoulders (726 2)  23  Insomnia (780 52) (G47 00)  24  Left knee pain (719 46) (M25 562)  25  Lung metastasis (197 0) (C78 00)  26  Myalgia And Myositis (729 1)  27  Nicotine dependence (305 1) (F17 200)  28  Obstructive sleep apnea (327 23) (G47 33)  29  Organic impotence (607 84) (N52 9)  30  Other headache syndrome (339 89) (G44 89)  31  Right elbow pain (719 42) (M25 521)  32  Scoliosis (737 30) (M41 9)  33   Sebaceous cyst (706 2) (L72 3)  34  Sebaceous cyst (706 2) (L72 3)  35  Status post angioplasty (V45 89) (Z98 62)  36  Ulnar neuritis (723 4) (G56 20)  37  Upper respiratory infection (465 9) (J06 9)    Past Medical History   1  Acute upper respiratory infection (465 9) (J06 9)  2  Chronic laryngitis (476 0) (J37 0)  3  General medical examination (V70 9) (Z00 00)  4  Sprained Metatarsophalangeal Joint (845 12)  5  Tracheobronchitis (490) (J40)    Surgical History   1  History of Ileostomy  2  History of Lung Surgery    The surgical history was reviewed and updated today  Family History  Brother   1  Family history of malignant neoplasm (V16 9) (Z80 9)    The family history was reviewed and updated today  Social History    · Being A Social Drinker   · Occupation: Retired   · Recently Stopping Smoking (V15 82)  The social history was reviewed and updated today  The social history was reviewed and is unchanged  Current Meds  1  Albuterol Sulfate (2 5 MG/3ML) 0 083% Inhalation Nebulization Solution; USE 1 UNIT   DOSE VIA NEBULIZER  4 TIMES A DAY AS NEEDED; Therapy: 54BDF6369 to (Last Rx:05Mar2014)  Requested for: 96JPY0128 Ordered  2  AmLODIPine Besylate 5 MG Oral Tablet; 1 every day; Therapy: 66MWI6581 to (Last Rx:08Mar2016)  Requested for: 58QHO4852 Ordered  3  Aspir-81 81 MG Oral Tablet Delayed Release; 1 Every Day; Therapy: 89JQQ1820 to  Requested for: 55LPT9897 Recorded  4  Ipratropium-Albuterol 0 5-2 5 (3) MG/3ML Inhalation Solution; use TID as directed prn; Therapy: 00OYZ3325 to (Last Rx:05Mar2014)  Requested for: 57LYT2861 Ordered  5  Levothyroxine Sodium 175 MCG Oral Tablet; TAKE 1 TABLET DAILY AS DIRECTED; Therapy: 42LGB0013 to (Evaluate:78Xhv1313)  Requested for: 77VFV9102; Last   Rx:57Nzn2199 Ordered  6  Lumigan 0 03 % SOLN; 1 gtt in each eye (2 5 ml); Therapy: 74HGR6201 to  Requested for: 69IYY7697 Recorded  7  Multivitamins TABS; 1 Every Day;    Therapy: 48UJP7564 to  Requested for: 59DBJ4648 Recorded  8  Nuvigil 150 MG Oral Tablet; Take 1 tablet daily; Therapy: 72Azb8204 to (Evaluate:54Crl8684); Last Rx:08Eod4613 Ordered  9  Simvastatin 40 MG Oral Tablet; TAKE ONE TABLET BY MOUTH ONCE DAILY; Therapy: 64PAL0068 to (Di Phlegm)  Requested for: 14BQZ8579; Last   Rx:83Xnw6258 Ordered  10  Spiriva HandiHaler 18 MCG Inhalation Capsule; as directed; Therapy: 16Lgx4669 to  Requested for: 93ZFH6597 Recorded  11  Toprol XL 50 MG Oral Tablet Extended Release 24 Hour; 1 Every Day; Therapy: 72JFU4703 to  Requested for: 26Sco9016 Recorded  12  Vitamin C 1000 MG Oral Tablet; 1 Every Day; Therapy: 72PJQ6998 to  Requested for: 42AYU7754 Recorded    Allergies   1  CORTISONE  2  Penicillins  3  Darvon CAPS    Immunizations  Influenza --- Dionna Quest: 68SMI5805Hpjmoq Sauce: 70GXS2704; Enrique Less: 19LHR4595; Magda Brush: 03TWY8082; Series5: 88OTG1576; Alexandro Pikes: 75VHJ9107Eaqgjbyn Bunde: 21BEO4148; [place] Servant: 37FPH4460; Pat Heredia:  11MWW9076Gcqfdb Slate: 86QPS6182; Yamilet Lias: 10FJX4941; IDHQNS21: 60CNQ4782; FHSWSL31:  94PDD0525; WYVYQW41: 49OAQ6399   Pneumovax 23 25 MCG/0 5ML Injection Injectable --- Dionna Quest: 81MJH2446;  Series2: 86ZRA9721   Td/DT --- Dionna Quest: 33ITQ5525     Vitals  Signs [Data Includes: Current Encounter]   Recorded: 38CRU1409 09:45AM   Temperature: 97 4 F  Heart Rate: 84  Respiration: 16  Systolic: 652  Diastolic: 82  Height: 5 ft 8 in  Weight: 169 lb   BMI Calculated: 25 7  BSA Calculated: 1 91    Results/Data  Encounter Results   *VB-Urinary Incontinence Screen (Dx V81 6 Screen for UI) 75HQM9306 09:49AM Delmonico, Deyanne Hashimoto     Test Name Result Flag Reference   Urinary Incontinence Assessment 21Jun2016       Results   (1) COMPREHENSIVE METABOLIC PANEL 17AKD2497 03:61SW Delmonico, Deyanne Hashimoto     Test Name Result Flag Reference   Glucose, Serum 97 mg/dL  65-99   BUN 22 mg/dL  8-27   Creatinine, Serum 1 16 mg/dL  0 76-1 27   eGFR If NonAfricn Am 58 mL/min/1 73 L >59   eGFR If Africn Am 67 mL/min/1 73  >59   BUN/Creatinine Ratio 19  10-22   Sodium, Serum 142 mmol/L  134-144   Potassium, Serum 4 5 mmol/L  3 5-5 2   Chloride, Serum 101 mmol/L     Carbon Dioxide, Total 24 mmol/L  18-29   Calcium, Serum 9 6 mg/dL  8 6-10 2   Protein, Total, Serum 6 4 g/dL  6 0-8 5   Albumin, Serum 4 1 g/dL  3 5-4 7   Globulin, Total 2 3 g/dL  1 5-4 5   A/G Ratio 1 8  1 1-2 5   Bilirubin, Total 0 6 mg/dL  0 0-1 2   Alkaline Phosphatase, S 57 IU/L     AST (SGOT) 30 IU/L  0-40   ALT (SGPT) 16 IU/L  0-44     (1) LIPID PANEL FASTING W DIRECT LDL REFLEX 55YEQ3239 07:07AM Lalo Acosta Solo     Test Name Result Flag Reference   Cholesterol, Total 192 mg/dL  100-199   Triglycerides 178 mg/dL H 0-149   HDL Cholesterol 49 mg/dL  >39   According to ATP-III Guidelines, HDL-C >59 mg/dL is considered a  negative risk factor for CHD     LDL Cholesterol Calc 107 mg/dL H 0-99     (1) TSH 46PCH4736 07:07AM Lalo Acosta Solarnulfo     Test Name Result Flag Reference   TSH 1 750 uIU/mL  0 450-4 500     Signatures   Electronically signed by : Fernando Dawson MD; Jun 21 2016  9:52AM EST                       (Author)    Electronically signed by : Fernando Dawson MD; Jun 21 2016  9:56AM EST                       (Author)

## 2018-01-14 NOTE — MISCELLANEOUS
Message  I spoke with Mr Evelyn Hamm on 8/17/16  He was on the [employer] site for being admitted to [facility] this month  We did not receive any hospital CAROLYN or discharge instructions  I faxed [facility] a Medical records request  He was admitted for Chest pain under Dr Shy Cruz and he states that he had a few stents placed  He states that he is feeling well  He denies chest pain  He does have some slight intermittent shortness of breath but states that this is normal for him and it is "from his COPD" I reviewed his medications with him ( as per what Rachael Cruz states he is currently taking) and I updated his chart  I made him an appt for next week with Dr Monie Abreu  He is aware to call our office at any time with any questions or concerns and he is also aware that if he has any chest pains, dyspnea, weakness, dizziness, palpitations, fevers, etc he should go to the [facility]      Active Problems    1  Abnormal electrocardiogram (794 31) (R94 31)   2  Acute pyelitis (590 10) (N10)   3  Acute sinusitis (461 9) (J01 90)   4  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) (C61)   5  Arthritis of knee (716 96) (M19 90)   6  Backache (724 5) (M54 9)   7  Benign essential hypertension (401 1) (I10)   8  Bladder cancer (188 9) (C67 9)   9  Body mass index (BMI) of 25 0 to 29 9 (278 02) (E66 3)   10  Bursitis (727 3) (M71 9)   11  Carcinoma of bladder (188 9) (C67 9)   12  Carpal tunnel syndrome 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) (G56 01)   13  Cervicalgia (723 1) (M54 2)   14  Chest pain (786 50) (R07 9)   15  Chronic obstructive pulmonary disease (496) (J44 9)   16  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)   17  Cubital tunnel syndrome on right (354 2) (G56 21)   18  Diarrhea (787 91) (R19 7)   19  Dizziness (780 4) (R42)   20  Drowsiness (780 09) (R40 0)   21  Flu vaccine need (V04 81) (Z23)   22  Hypothyroidism (244 9) (E03 9)   23  Impingement Of Both Shoulders (726 2)   24  Insomnia (780 52) (G47 00)   25   Left knee pain (719 46) (M25 562)   26  Lung metastasis (197 0) (C78 00)   27  Medicare annual wellness visit, initial (V70 0) (Z00 00)   28  Myalgia And Myositis (729 1)   29  Need for zoster vaccination (V04 89) (Z23)   30  Nicotine dependence (305 1) (F17 200)   31  Obstructive sleep apnea (327 23) (G47 33)   32  Organic impotence (607 84) (N52 9)   33  Other headache syndrome (339 89) (G44 89)   34  Right elbow pain (719 42) (M25 521)   35  Scoliosis (737 30) (M41 9)   36  Screening for genitourinary condition (V81 6) (Z13 89)   37  Sebaceous cyst (706 2) (L72 3)   38  Sebaceous cyst (706 2) (L72 3)   39  Status post angioplasty (V45 89) (Z98 62)   40  Ulnar neuritis (723 4) (G56 20)   41  Upper respiratory infection (465 9) (J06 9)    Current Meds   1  Albuterol Sulfate (2 5 MG/3ML) 0 083% Inhalation Nebulization Solution; USE 1 UNIT   DOSE VIA NEBULIZER  4 TIMES A DAY AS NEEDED; Therapy: 21NKJ7036 to (Last Rx:05Mar2014)  Requested for: 46ZZN6465 Ordered   2  AmLODIPine Besylate 5 MG Oral Tablet; 1 every day; Therapy: 43NZY1867 to (Last Rx:08Mar2016)  Requested for: 93IBZ0609 Ordered   3  Aspir-81 81 MG Oral Tablet Delayed Release; 1 Every Day; Therapy: 16ENV8270 to  Requested for: 16GEV4013 Recorded   4  Brilinta 90 MG Oral Tablet; TAKE 1 TABLET TWICE DAILY; Therapy: (Recorded:75Kri5446) to Recorded   5  Ipratropium-Albuterol 0 5-2 5 (3) MG/3ML Inhalation Solution; use TID as directed prn; Therapy: 40FST4507 to (Last Rx:05Mar2014)  Requested for: 16SRH8722 Ordered   6  Levothyroxine Sodium 175 MCG Oral Tablet; TAKE 1 TABLET DAILY AS DIRECTED; Therapy: 14TNQ9894 to (Evaluate:05Vyj8597)  Requested for: 59GSP4669; Last   Rx:92Nho1153 Ordered   7  Losartan Potassium 50 MG Oral Tablet; 1/2 tablet daily as per Dr Rosie Walter; Therapy: (Recorded:57Qmq9331) to Recorded   8  Lumigan 0 03 % SOLN; 1 gtt in each eye (2 5 ml); Therapy: 29TAU0333 to  Requested for: 83CDZ8752 Recorded   9   Multivitamins TABS; 1 Every Day;   Therapy: 45LVU6549 to  Requested for: 41SPZ8459 Recorded   10  Nuvigil 150 MG Oral Tablet; Take 1 tablet daily; Therapy: 62Gmr6725 to (Evaluate:21Apr2016); Last Rx:29Mni2135 Ordered   11  Simvastatin 40 MG Oral Tablet; TAKE ONE TABLET BY MOUTH ONCE DAILY; Therapy: 08JHG0188 to (Elijah Owen)  Requested for: 53QEN1374; Last    Rx:09Nfb3425 Ordered   12  Spiriva HandiHaler 18 MCG Inhalation Capsule; as directed; Therapy: 83Koc1337 to  Requested for: 95ZQO7958 Recorded   13  Toprol XL 50 MG Oral Tablet Extended Release 24 Hour; 1 Every Day; Therapy: 40IBX2409 to  Requested for: 22Lco2154 Recorded   14  Vitamin C 1000 MG Oral Tablet; 1 Every Day; Therapy: 47XSH1239 to  Requested for: 51QYF0804 Recorded    Allergies    1  CORTISONE   2  Penicillins   3   Darvon CAPS    Signatures   Electronically signed by : Dakota Sharma MD; Aug 17 2016  1:33PM EST                       (Author)

## 2018-01-15 NOTE — RESULT NOTES
Verified Results  (1) COMPREHENSIVE METABOLIC PANEL [id number] 71:70DR Robinsonmary aliceAidenJosse Jamesdevante     Test Name Result Flag Reference   Glucose, Serum 104 mg/dL H 65-99   BUN 18 mg/dL  8-27   Creatinine, Serum 1 02 mg/dL  0 76-1 27   eGFR If NonAfricn Am 68 mL/min/1 73  >59   eGFR If Africn Am 78 mL/min/1 73  >59   BUN/Creatinine Ratio 18  10-22   Sodium, Serum 140 mmol/L  136-144   **Please note reference interval change**   Potassium, Serum 4 8 mmol/L  3 5-5 2   **Please note reference interval change**   Chloride, Serum 101 mmol/L     **Please note reference interval change**   Carbon Dioxide, Total 24 mmol/L  18-29   Calcium, Serum 9 8 mg/dL  8 6-10 2   Protein, Total, Serum 6 3 g/dL  6 0-8 5   Albumin, Serum 4 0 g/dL  3 5-4 7   Globulin, Total 2 3 g/dL  1 5-4 5   A/G Ratio 1 7  1 1-2 5   Bilirubin, Total 0 4 mg/dL  0 0-1 2   Alkaline Phosphatase, S 50 IU/L     AST (SGOT) 22 IU/L  0-40   ALT (SGPT) 15 IU/L  0-44     (1) LIPID PANEL, FASTING 22Oct2016 11:18AM aKrla Josse Jamesdevante     Test Name Result Flag Reference   Cholesterol, Total 182 mg/dL  100-199   Triglycerides 224 mg/dL H 0-149   HDL Cholesterol 47 mg/dL  >39   According to ATP-III Guidelines, HDL-C >59 mg/dL is considered a  negative risk factor for CHD  VLDL Cholesterol Jn 45 mg/dL H 5-40   LDL Cholesterol Calc 90 mg/dL  0-99   T  Chol/HDL Ratio 3 9 ratio units  0 0-5 0   T  Chol/HDL Ratio                                                             Men  Women                                               1/2 Avg  Risk  3 4    3 3                                                   Avg Risk  5 0    4 4                                                2X Avg  Risk  9 6    7 1                                                3X Avg  Risk 23 4   11 0     (1) TSH 22Oct2016 11:18AM Josse Acosta     Test Name Result Flag Reference   TSH 0 051 uIU/mL L 0 450-4 500     [facility]) Cardiovascular Risk Assessment 99VKY0182 11:18AM Josse Acosta     Test Name Result Flag Reference   Interpretation Note     Supplement report is available  PDF Image

## 2018-01-15 NOTE — RESULT NOTES
Verified Results  *VB - Urinary Incontinence Screen (Dx Z13 89 Screen for UI) 01EIN2180 02:28PM Kamila Acosta Gu     Test Name Result Flag Reference   Urinary Incontinence Assessment [id number]

## 2018-01-18 NOTE — MISCELLANEOUS
Provider Comments  Provider Comments:   LEFT MESSAGE WITH WIFE REGARDING NO SHOW, THEY FORGOT  HE WILL CALL BACK TO RESCHEDULE        Signatures   Electronically signed by : Oliverio Manzanares MD; Aug 23 2016 11:52AM EST                       (Author)

## 2018-01-22 VITALS
TEMPERATURE: 97.7 F | DIASTOLIC BLOOD PRESSURE: 66 MMHG | SYSTOLIC BLOOD PRESSURE: 136 MMHG | BODY MASS INDEX: 28.12 KG/M2 | HEIGHT: 66 IN | RESPIRATION RATE: 20 BRPM | WEIGHT: 175 LBS | HEART RATE: 86 BPM

## 2018-01-22 VITALS
DIASTOLIC BLOOD PRESSURE: 60 MMHG | BODY MASS INDEX: 27.32 KG/M2 | SYSTOLIC BLOOD PRESSURE: 130 MMHG | WEIGHT: 170 LBS | HEIGHT: 66 IN

## 2018-01-23 NOTE — MISCELLANEOUS
Message  I spoke with Heydi Becerra on 12/8  He was discharged from [facility] on 12/5/17  He was admitted for Chest pain  He currently denies chest pain, dyspnea or dizziness but states that he is tired "from being in the hospital and laying in bed for 4 days" He has an appetite and is tolerating his diet/fluids  He is ambulating around the home fine  I made him an appt for next week with Dr Vanessa Dunham and he knows to call the office at any time with any questions or concerns and to go to the ER if any chest pain, dyspnea, weakness, dizziness, palpitations, fevers, etc JMoyleLPN      Active Problems    1  Abnormal electrocardiogram (794 31) (R94 31)   2  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) (C61)   3  Arthritis of knee (716 96) (M17 10)   4  Backache (724 5) (M54 9)   5  Benign essential hypertension (401 1) (I10)   6  Bilateral shoulder pain (719 41) (M25 511,M25 512)   7  BMI 26 0-26 9,adult (V85 22) (Z68 26)   8  Body mass index (BMI) of 25 0 to 29 9 (278 02) (E66 3)   9  Bursitis (727 3) (M71 9)   10  Carcinoma of bladder (188 9) (C67 9)   11  Carpal tunnel syndrome 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) (G56 01)   12  Cervicalgia (723 1) (M54 2)   13  Chest pain (786 50) (R07 9)   14  Chronic obstructive pulmonary disease (496) (J44 9)   15  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)   16  Coronary arteriosclerosis (414 00) (I25 10)   17  Cubital tunnel syndrome on right (354 2) (G56 21)   18  Dizziness (780 4) (R42)   19  Drowsiness (780 09) (R40 0)   20  Dysfunction of right eustachian tube (381 81) (H69 81)   21  Ear popping (388 8) (H93 8X9)   22  Flu vaccine need (V04 81) (Z23)   23  Hypothyroidism (244 9) (E03 9)   24  Impingement Of Both Shoulders (726 2)   25  Insomnia (780 52) (G47 00)   26  Left knee pain (719 46) (M25 562)   27  Lung metastasis (197 0) (C78 00)   28  Malaise (780 79) (R53 81)   29  Malignant tumor of urinary bladder (188 9) (C67 9)   30   Medicare annual wellness visit, initial (V70 0) (Z00 00)   31  Medicare annual wellness visit, subsequent (V70 0) (Z00 00)   32  Metatarsalgia of right foot (726 70) (M77 41)   33  Myalgia And Myositis (729 1)   34  Need for vaccination (V05 9) (Z23)   35  Need for zoster vaccination (V04 89) (Z23)   36  Nicotine dependence (305 1) (F17 200)   37  Obstructive sleep apnea (327 23) (G47 33)   38  Organic impotence (607 84) (N52 9)   39  Other headache syndrome (339 89) (G44 89)   40  Peripheral vascular disease (443 9) (I73 9)   41  Right elbow pain (719 42) (M25 521)   42  Scoliosis (737 30) (M41 9)   43  Screening for genitourinary condition (V81 6) (Z13 89)   44  Sebaceous cyst (706 2) (L72 3)   45  Sebaceous cyst (706 2) (L72 3)   46  Sensorineural hearing loss (SNHL) of both ears (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 18) (H90 3)   47  Status post angioplasty (V45 89) (Z98 62)   48  Tenosynovitis (727 00) (M65 9)   49  Ulnar neuritis (723 4) (G56 20)    Current Meds   1  Albuterol Sulfate (2 5 MG/3ML) 0 083% Inhalation Nebulization Solution; USE 1 UNIT   DOSE VIA NEBULIZER  4 TIMES A DAY AS NEEDED; Therapy: 43PXH7348 to (Last Rx:05Mar2014)  Requested for: 18GDE8917 Ordered   2  Ipratropium-Albuterol 0 5-2 5 (3) MG/3ML Inhalation Solution; use TID as directed prn; Therapy: 88ADI5552 to (Last Rx:05Mar2014)  Requested for: 21Nov2017 Ordered   3  Ketorolac Tromethamine 10 MG Oral Tablet; TAKE 1 TABLET 3 TIMES DAILY AS   NEEDED FOR PAIN;   Therapy: 77QFR9574 to (Evaluate:18Nov2017)  Requested for: 61JQP8887; Last   Rx:39Uow5981 Ordered   4  Levothyroxine Sodium 150 MCG Oral Tablet; TAKE ONE TABLET BY MOUTH ONCE   DAILY; Therapy: 48Yix9856 to (Gerhardt Crafts)  Requested for: 86AVE6176; Last   Rx:71Vie8812 Ordered   5  Losartan Potassium 50 MG Oral Tablet; 1/2 tablet daily as per Dr Duncan Marie; Therapy: (Dixie Mcelroy) to Recorded   6  Lumigan 0 03 % SOLN; 1 gtt in each eye (2 5 ml); Therapy: 88VRQ4908 to  Requested for: 26PUN6371 Recorded   7   Meclizine HCl - 12 5 MG Oral Tablet; TAKE 1 TABLET 3 TIMES DAILY AS NEEDED; Therapy: 00VVL7794 to (Evaluate:01Phl5901)  Requested for: 92GFM8131; Last   Rx:61Aqd8589 Ordered   8  MethylPREDNISolone 4 MG Oral Tablet Therapy Pack; take as directed; Therapy: 66EKZ9279 to (Last Rx:21Nov2017)  Requested for: 21Nov2017 Ordered   9  Multivitamins TABS; 1 Every Day; Therapy: 52BVU5450 to  Requested for: 20NRR8727 Recorded   10  Simvastatin 40 MG Oral Tablet; TAKE ONE TABLET BY MOUTH ONCE DAILY; Therapy: 48YWB5791 to (Fatmata Caba)  Requested for: 99JZZ8704; Last    Rx:59Lul1144 Ordered   11  Spiriva HandiHaler 18 MCG Inhalation Capsule; as directed; Therapy: 09Xos0074 to  Requested for: 21Nov2017 Recorded   12  Toprol XL 50 MG Oral Tablet Extended Release 24 Hour; 1 every day; Therapy: 16DOT0781 to  Requested for: 77KLJ0282 Recorded   13  Vitamin C 1000 MG Oral Tablet; 1 Every Day; Therapy: 96SSR1823 to  Requested for: 70EZK3368 Recorded    Allergies    1  CORTISONE   2  Penicillins   3   Darvon CAPS    Signatures   Electronically signed by : Kaylin Walker MD; Dec 11 2017  8:39AM EST                       (Author)

## 2018-01-24 VITALS
RESPIRATION RATE: 16 BRPM | DIASTOLIC BLOOD PRESSURE: 50 MMHG | SYSTOLIC BLOOD PRESSURE: 130 MMHG | WEIGHT: 173 LBS | BODY MASS INDEX: 27.92 KG/M2 | TEMPERATURE: 96 F | HEART RATE: 72 BPM

## 2018-01-26 ENCOUNTER — OFFICE VISIT (OUTPATIENT)
Dept: FAMILY MEDICINE CLINIC | Facility: CLINIC | Age: 83
End: 2018-01-26
Payer: COMMERCIAL

## 2018-01-26 VITALS
RESPIRATION RATE: 24 BRPM | HEIGHT: 66 IN | BODY MASS INDEX: 27 KG/M2 | DIASTOLIC BLOOD PRESSURE: 98 MMHG | HEART RATE: 80 BPM | TEMPERATURE: 97.2 F | SYSTOLIC BLOOD PRESSURE: 150 MMHG | WEIGHT: 168 LBS

## 2018-01-26 DIAGNOSIS — I10 BENIGN ESSENTIAL HYPERTENSION: ICD-10-CM

## 2018-01-26 DIAGNOSIS — I25.10 CORONARY ARTERIOSCLEROSIS: ICD-10-CM

## 2018-01-26 DIAGNOSIS — J41.1 MUCOPURULENT CHRONIC BRONCHITIS (HCC): Primary | ICD-10-CM

## 2018-01-26 DIAGNOSIS — J20.9 BRONCHITIS WITH BRONCHOSPASM: ICD-10-CM

## 2018-01-26 PROBLEM — R42 DIZZINESS: Status: ACTIVE | Noted: 2017-07-17

## 2018-01-26 PROBLEM — M25.512 BILATERAL SHOULDER PAIN: Status: ACTIVE | Noted: 2017-11-15

## 2018-01-26 PROBLEM — M25.511 BILATERAL SHOULDER PAIN: Status: ACTIVE | Noted: 2017-11-15

## 2018-01-26 PROBLEM — I73.9 PERIPHERAL VASCULAR DISEASE (HCC): Status: ACTIVE | Noted: 2017-07-05

## 2018-01-26 PROBLEM — H69.81 DYSFUNCTION OF RIGHT EUSTACHIAN TUBE: Status: ACTIVE | Noted: 2017-08-25

## 2018-01-26 PROBLEM — H90.3 SENSORINEURAL HEARING LOSS (SNHL) OF BOTH EARS: Status: ACTIVE | Noted: 2017-09-07

## 2018-01-26 PROBLEM — M65.9 TENOSYNOVITIS: Status: ACTIVE | Noted: 2017-02-17

## 2018-01-26 PROCEDURE — 99213 OFFICE O/P EST LOW 20 MIN: CPT | Performed by: FAMILY MEDICINE

## 2018-01-26 RX ORDER — LEVOTHYROXINE SODIUM 0.15 MG/1
1 TABLET ORAL DAILY
COMMUNITY
Start: 2010-12-23 | End: 2018-04-17 | Stop reason: DRUGHIGH

## 2018-01-26 RX ORDER — MULTIVIT-MINERALS/FA/LYCOPENE 0.4 MG-6
TABLET ORAL DAILY
COMMUNITY
Start: 2006-07-20

## 2018-01-26 RX ORDER — CLOPIDOGREL BISULFATE 75 MG/1
TABLET ORAL
COMMUNITY
End: 2018-08-29 | Stop reason: SDUPTHER

## 2018-01-26 RX ORDER — LOSARTAN POTASSIUM 50 MG/1
50 TABLET ORAL DAILY
COMMUNITY
End: 2019-06-20 | Stop reason: HOSPADM

## 2018-01-26 RX ORDER — BENZONATATE 200 MG/1
200 CAPSULE ORAL 3 TIMES DAILY PRN
Qty: 20 CAPSULE | Refills: 0 | Status: SHIPPED | OUTPATIENT
Start: 2018-01-26 | End: 2019-03-29 | Stop reason: HOSPADM

## 2018-01-26 RX ORDER — SIMVASTATIN 80 MG
1 TABLET ORAL DAILY
COMMUNITY
Start: 2011-10-17 | End: 2018-04-17 | Stop reason: CLARIF

## 2018-01-26 RX ORDER — NITROGLYCERIN 0.4 MG/1
0.4 TABLET SUBLINGUAL
COMMUNITY

## 2018-01-26 RX ORDER — METOPROLOL SUCCINATE 50 MG/1
50 TABLET, EXTENDED RELEASE ORAL 2 TIMES DAILY
Status: ON HOLD | COMMUNITY
Start: 2007-10-04 | End: 2019-03-25 | Stop reason: CLARIF

## 2018-01-26 RX ORDER — IPRATROPIUM BROMIDE AND ALBUTEROL SULFATE 2.5; .5 MG/3ML; MG/3ML
SOLUTION RESPIRATORY (INHALATION)
COMMUNITY
Start: 2011-02-18 | End: 2018-04-17 | Stop reason: SDUPTHER

## 2018-01-26 RX ORDER — DOXYCYCLINE HYCLATE 100 MG/1
100 CAPSULE ORAL EVERY 12 HOURS SCHEDULED
Qty: 14 CAPSULE | Refills: 0 | Status: SHIPPED | OUTPATIENT
Start: 2018-01-26 | End: 2018-02-02

## 2018-01-26 RX ORDER — MULTIVIT WITH MINERALS/LUTEIN
TABLET ORAL DAILY
COMMUNITY
Start: 2006-05-08

## 2018-01-26 RX ORDER — GUAIFENESIN AND CODEINE PHOSPHATE 100; 10 MG/5ML; MG/5ML
5 SOLUTION ORAL 3 TIMES DAILY PRN
Qty: 120 ML | Refills: 0 | Status: SHIPPED | OUTPATIENT
Start: 2018-01-26

## 2018-01-26 RX ORDER — KETOROLAC TROMETHAMINE 10 MG/1
1 TABLET, FILM COATED ORAL 3 TIMES DAILY PRN
COMMUNITY
Start: 2017-11-15 | End: 2018-04-17 | Stop reason: ALTCHOICE

## 2018-01-26 RX ORDER — MECLIZINE HCL 12.5 MG/1
1 TABLET ORAL 3 TIMES DAILY PRN
COMMUNITY
Start: 2017-07-17

## 2018-01-26 RX ORDER — BIMATOPROST 0.3 MG/ML
1 SOLUTION/ DROPS OPHTHALMIC
COMMUNITY
Start: 2009-01-15

## 2018-01-26 RX ORDER — LATANOPROST 50 UG/ML
1 SOLUTION/ DROPS OPHTHALMIC
Status: ON HOLD | COMMUNITY
End: 2019-03-25

## 2018-01-26 RX ORDER — ALBUTEROL SULFATE 2.5 MG/3ML
1 SOLUTION RESPIRATORY (INHALATION) 4 TIMES DAILY PRN
COMMUNITY
Start: 2011-02-18 | End: 2018-05-02 | Stop reason: SDUPTHER

## 2018-01-26 RX ORDER — ASPIRIN 81 MG/1
TABLET, CHEWABLE ORAL
COMMUNITY

## 2018-03-07 NOTE — CONSULTS
Plan    1  Comprehensive Audiogram with Tympanometry; Status:Active; Requested   EVR:91ZLJ9701;     Assessment    1  Dysfunction of right eustachian tube (381 81) (H69 81)   2  History of malignant neoplasm (V10 90) (Z85 9)   3  History of hypertension (V12 59) (Z86 79)   4  History of arthritis (V13 4) (Z87 39)   5  History of myocardial infarction (412) (I25 2)   6  History of thyroid disorder (V12 29) (Z86 39)   7  Family history of cardiac disorder (V17 49) (Z82 49) : Family History, Sibling   6  Family history of malignant neoplasm (V16 9) (Z80 9) : Brother, Family History, Sibling   9  Family history of arthritis (V17 7) (Z82 61) : Family History, Sibling   8  Nicotine dependence (305 1) (F17 200)   11  Rarely consumes alcohol (V49 89) (Z78 9)   12  Retired   15     14  Has 1 child   13  Sensorineural hearing loss (SNHL) of both ears (389 18) (H90 3)    Chief Complaint  Chief Complaint Free Text Note Form: Rebeca Hopper is here for c/o right ear issues  He also c/o balance issues and lightheadedness when he stands up from a sitting position      History of Present Illness  HPI: He presents with right ear problems  The right ear feels "different " He is not able to describe the sensation very well  He has a sense of openness in the right ear, as if it just popped  He describes an mild autophony in his right ear  Symptoms present for the past 2 weeks  He has had associated balance problems -- light headedness, no syncope  Symptoms last seconds  He has been to vestibular rehabiliation for this and feels it made it worse  He denies associated vertigo or nausea  He denies hearing fluctuation or tinnitus  Denies aural fullness, otlagia, otorrhea  Review of Systems  Complete ENT ROS [facility]:   Eyes: No complaints of itching, excessive tearing or vision changes  Ears: ear imbalance  Nose: discharge or runny nose  Mouth: No sores in mouth, no altered taste, no dental problems  Throat: hoarseness     Neck: No neck soreness, no neck pain, no neck lumps or swelling  Genitourinary: No complaints of dysuria, flank pain or frequent urination  Cardiovascular: No complaints of chest pain or palpitations  Respiratory: No complaints of shortness of breath, cough or wheezing  Gastrointestinal: No complaints of heartburn, nausea/vomiting, or constipation  Neurological: No complaints of headache, convulsions or memory loss  ROS Reviewed:   ROS reviewed  Active Problems    1  Abnormal electrocardiogram (794 31) (R94 31)   2  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) (C61)   3  Arthritis of knee (716 96) (M17 10)   4  Backache (724 5) (M54 9)   5  Benign essential hypertension (401 1) (I10)   6  BMI 26 0-26 9,adult (V85 22) (Z68 26)   7  Body mass index (BMI) of 25 0 to 29 9 (278 02) (E66 3)   8  Bursitis (727 3) (M71 9)   9  Carcinoma of bladder (188 9) (C67 9)   10  Carpal tunnel syndrome 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) (G56 01)   11  Cervicalgia (723 1) (M54 2)   12  Chest pain (786 50) (R07 9)   13  Chronic obstructive pulmonary disease (496) (J44 9)   14  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)   15  Coronary arteriosclerosis (414 00) (I25 10)   16  Cubital tunnel syndrome on right (354 2) (G56 21)   17  Dizziness (780 4) (R42)   18  Drowsiness (780 09) (R40 0)   19  Dysfunction of right eustachian tube (381 81) (H69 81)   20  Ear popping (388 8) (H93 8X9)   21  Flu vaccine need (V04 81) (Z23)   22  Hypothyroidism (244 9) (E03 9)   23  Impingement Of Both Shoulders (726 2)   24  Insomnia (780 52) (G47 00)   25  Left knee pain (719 46) (M25 562)   26  Lung metastasis (197 0) (C78 00)   27  Malaise (780 79) (R53 81)   28  Malignant tumor of urinary bladder (188 9) (C67 9)   29  Medicare annual wellness visit, initial (V70 0) (Z00 00)   30  Metatarsalgia of right foot (726 70) (M77 41)   31  Myalgia And Myositis (729 1)   32  Need for zoster vaccination (V04 89) (Z23)   33   Nicotine dependence (305 1) (F17 200) 34  Obstructive sleep apnea (327 23) (G47 33)   35  Organic impotence (607 84) (N52 9)   36  Other headache syndrome (339 89) (G44 89)   37  Peripheral vascular disease (443 9) (I73 9)   38  Right elbow pain (719 42) (M25 521)   39  Scoliosis (737 30) (M41 9)   40  Screening for genitourinary condition (V81 6) (Z13 89)   41  Sebaceous cyst (706 2) (L72 3)   42  Sebaceous cyst (706 2) (L72 3)   43  Status post angioplasty (V45 89) (Z98 62)   44  Tenosynovitis (727 00) (M65 9)   45  Ulnar neuritis (723 4) (G56 20)    Past Medical History    1  History of Acute pyelitis (590 10) (N10)   2  Acute upper respiratory infection (465 9) (J06 9)   3  Chronic laryngitis (476 0) (J37 0)   4  General medical examination (V70 9) (Z00 00)   5  History of acute sinusitis (V12 69) (Z87 09)   6  History of diarrhea (V12 79) (Z87 898)   7  History of dizziness (V13 89) (Z87 898)   8  History of upper respiratory infection (V12 09) (Z87 09)   9  History of Presence of stent in coronary artery (V45 82) (Z95 5)   10  Sprained Metatarsophalangeal Joint (845 12)   11  Tracheobronchitis (490) (J40)  Past Medical History Reviewed: The past medical history was reviewed and updated today  Surgical History    1  History of Ileostomy   2  History of Lung Surgery    Family History  Sibling    1  Family history of malignant neoplasm (V16 9) (Z80 9)  Brother    2  Family history of malignant neoplasm (V16 9) (Z80 9)  Family History    3  Family history of malignant neoplasm (V16 9) (Z80 9)  Family History Reviewed: The family history was reviewed and updated today  Social History    · Being A Social Drinker   · Occupation: Retired   · Recently Stopping Smoking (V15 82)  Social History Reviewed: The social history was reviewed and updated today  Current Meds   1  Albuterol Sulfate (2 5 MG/3ML) 0 083% Inhalation Nebulization Solution; USE 1 UNIT   DOSE VIA NEBULIZER  4 TIMES A DAY AS NEEDED;    Therapy: 82BLB1116 to (Last CZ:13NCK1981)  Requested for: 90OOC9408 Ordered   2  Ipratropium-Albuterol 0 5-2 5 (3) MG/3ML Inhalation Solution; use TID as directed prn; Therapy: 74SEZ8600 to (Last Rx:05Mar2014)  Requested for: 45RWR2980 Ordered   3  Levothyroxine Sodium 150 MCG Oral Tablet; TAKE ONE TABLET BY MOUTH ONCE   DAILY; Therapy: 96Noa7089 to (Lenora Jnasen)  Requested for: 80UIC5127; Last   Rx:44Rya7418 Ordered   4  Losartan Potassium 50 MG Oral Tablet; 1/2 tablet daily as per Dr Gem Jimenez; Therapy: (Recorded:00Aus5348) to Recorded   5  Lumigan 0 03 % SOLN; 1 gtt in each eye (2 5 ml); Therapy: 02JUP5697 to  Requested for: 38XJX5875 Recorded   6  Meclizine HCl - 12 5 MG Oral Tablet; TAKE 1 TABLET 3 TIMES DAILY AS NEEDED; Therapy: 86MXI7070 to (Evaluate:98Yua9972)  Requested for: 64EIH8874; Last   Rx:96Sjl3035 Ordered   7  Multivitamins TABS; 1 Every Day; Therapy: 56WWT4237 to  Requested for: 91CWR4848 Recorded   8  Simvastatin 40 MG Oral Tablet; TAKE ONE TABLET BY MOUTH ONCE DAILY; Therapy: 30DOY1245 to (Lenora Jansen)  Requested for: 15SIU2079; Last   Rx:61Bjx1567 Ordered   9  Spiriva HandiHaler 18 MCG Inhalation Capsule; as directed; Therapy: 32Rzr8782 to  Requested for: 82FAJ6419 Recorded   10  Toprol XL 50 MG Oral Tablet Extended Release 24 Hour; 1 every day; Therapy: 94RKI2991 to  Requested for: 25LJW9685 Recorded   11  Vitamin C 1000 MG Oral Tablet; 1 Every Day; Therapy: 07HQD4263 to  Requested for: 39CZB7055 Recorded    Allergies    1  CORTISONE   2  Penicillins   3  Darvon CAPS    Vitals  Signs   Recorded: 65PGZ4100 71:13DS   Systolic: 170, RUE, Sitting  Diastolic: 60, RUE, Sitting  Height: 5 ft 6 in  Weight: 170 lb   BMI Calculated: 27 44  BSA Calculated: 1 87    Physical Exam  Constitutional:  Well developed, well nourished and groomed, in no acute distress    Eyes:  Extra-ocular movements intact, pupils equally round and reactive to light and accommodation, the lids and conjunctivae are normal in appearance  HEENT:    Head: Atraumatic, normocephalic, no visible scalp lesions, bony palpation unremarkable without stepoffs, parotid and submandibular salivary glands non-tender to palpation and without masses bilaterally  Ears:  Auricles normal in appearance bilaterally, mastoid prominence non-tender, external auditory canals clear bilaterally, tympanic membranes intact bilaterally without evidence of middle ear effusion or masses, normal appearing ossicles  Drums do not move with autoinsufflation or with normal breathing  Nose/Sinuses:  External appearance unremarkable, no maxillary or frontal sinus tenderness to palpation bilaterally, anterior rhinoscopy reveals normal appearing mucosa, without polyps or masses  Oral Cavity:  Moist mucus membranes, gums dentition unremarkable, no oral mucosal masses or lesions, floor of mouth soft, tongue mobile without masses or lesions  Oropharynx:  Base of tongue soft and without masses, tonsils bilaterally unremarkable, soft palate mucosa unremarkable, laryngeal mirror exam unrevealing  Neck:  No visible or palpable cervical lesions or lymphadenopathy, thyroid gland is normal in size and symmetry and without masses, normal laryngeal elevation with swallowing  Cardiovascular:  Normal rate and rhythm, no palpable thrills, no jugulovenous distension observed  Respiratory:  Normal respiratory effort without evidence of retractions or use of accessory muscles  Integument:  Normal appearing without observed masses or lesions  Neurologic:  Cranial nerves II-XII intact bilaterally  Psychiatric:  Alert and oriented to time, place and person, normal affect  Discussion/Summary  Discussion Summary:   He has bilateral downward sloping SNHL that is worse in the left ear than the right (problem ear)  This is not the source of his symptoms  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

## 2018-03-08 ENCOUNTER — OFFICE VISIT (OUTPATIENT)
Dept: FAMILY MEDICINE CLINIC | Facility: CLINIC | Age: 83
End: 2018-03-08
Payer: COMMERCIAL

## 2018-03-08 VITALS
WEIGHT: 173 LBS | DIASTOLIC BLOOD PRESSURE: 68 MMHG | OXYGEN SATURATION: 92 % | BODY MASS INDEX: 27.8 KG/M2 | TEMPERATURE: 99.9 F | SYSTOLIC BLOOD PRESSURE: 112 MMHG | HEART RATE: 68 BPM | RESPIRATION RATE: 30 BRPM | HEIGHT: 66 IN

## 2018-03-08 DIAGNOSIS — J41.8 MIXED SIMPLE AND MUCOPURULENT CHRONIC BRONCHITIS (HCC): Primary | ICD-10-CM

## 2018-03-08 DIAGNOSIS — C61 ADENOCARCINOMA OF PROSTATE (HCC): ICD-10-CM

## 2018-03-08 DIAGNOSIS — I10 BENIGN ESSENTIAL HYPERTENSION: ICD-10-CM

## 2018-03-08 DIAGNOSIS — I25.10 CORONARY ARTERIOSCLEROSIS: ICD-10-CM

## 2018-03-08 DIAGNOSIS — E03.9 ACQUIRED HYPOTHYROIDISM: ICD-10-CM

## 2018-03-08 PROCEDURE — 99214 OFFICE O/P EST MOD 30 MIN: CPT | Performed by: FAMILY MEDICINE

## 2018-03-08 RX ORDER — DOXYCYCLINE 100 MG/1
100 CAPSULE ORAL 2 TIMES DAILY
Qty: 14 CAPSULE | Refills: 0 | Status: SHIPPED | OUTPATIENT
Start: 2018-03-08 | End: 2018-03-15

## 2018-03-08 RX ORDER — IPRATROPIUM BROMIDE AND ALBUTEROL SULFATE 2.5; .5 MG/3ML; MG/3ML
3 SOLUTION RESPIRATORY (INHALATION) ONCE
Status: DISCONTINUED | OUTPATIENT
Start: 2018-03-08 | End: 2019-03-29 | Stop reason: HOSPADM

## 2018-03-08 RX ORDER — ATORVASTATIN CALCIUM 20 MG/1
TABLET, FILM COATED ORAL
Refills: 0 | COMMUNITY
Start: 2017-12-05 | End: 2018-04-17 | Stop reason: SDUPTHER

## 2018-03-08 NOTE — PATIENT INSTRUCTIONS
Labs as ordered  Pulm Cardiology f/u   Nebs as needed 2-3x daily until improved   Complete medications  GI/Urology surveillance  ED if worsens given history   Mucinex daily     Hydrate well
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
## 2018-04-16 LAB
ALBUMIN SERPL-MCNC: 4.1 G/DL (ref 3.5–4.7)
ALBUMIN/GLOB SERPL: 2 {RATIO} (ref 1.2–2.2)
ALP SERPL-CCNC: 52 IU/L (ref 39–117)
ALT SERPL-CCNC: 22 IU/L (ref 0–44)
AST SERPL-CCNC: 28 IU/L (ref 0–40)
BILIRUB SERPL-MCNC: 0.3 MG/DL (ref 0–1.2)
BUN SERPL-MCNC: 19 MG/DL (ref 8–27)
BUN/CREAT SERPL: 17 (ref 10–24)
CALCIUM SERPL-MCNC: 9.3 MG/DL (ref 8.6–10.2)
CHLORIDE SERPL-SCNC: 99 MMOL/L (ref 96–106)
CHOLEST SERPL-MCNC: 225 MG/DL (ref 100–199)
CHOLEST/HDLC SERPL: 4.5 RATIO (ref 0–5)
CO2 SERPL-SCNC: 26 MMOL/L (ref 18–29)
CREAT SERPL-MCNC: 1.1 MG/DL (ref 0.76–1.27)
GLOBULIN SER-MCNC: 2.1 G/DL (ref 1.5–4.5)
GLUCOSE SERPL-MCNC: 96 MG/DL (ref 65–99)
HDLC SERPL-MCNC: 50 MG/DL
LDLC SERPL CALC-MCNC: 122 MG/DL (ref 0–99)
POTASSIUM SERPL-SCNC: 4 MMOL/L (ref 3.5–5.2)
PROT SERPL-MCNC: 6.2 G/DL (ref 6–8.5)
PSA SERPL-MCNC: <0.1 NG/ML (ref 0–4)
SL AMB EGFR AFRICAN AMERICAN: 71 ML/MIN/1.73
SL AMB EGFR NON AFRICAN AMERICAN: 61 ML/MIN/1.73
SL AMB VLDL CHOLESTEROL CALC: 53 MG/DL (ref 5–40)
SODIUM SERPL-SCNC: 140 MMOL/L (ref 134–144)
TRIGL SERPL-MCNC: 267 MG/DL (ref 0–149)
TSH SERPL DL<=0.005 MIU/L-ACNC: 12.55 UIU/ML (ref 0.45–4.5)

## 2018-04-17 ENCOUNTER — OFFICE VISIT (OUTPATIENT)
Dept: FAMILY MEDICINE CLINIC | Facility: CLINIC | Age: 83
End: 2018-04-17
Payer: COMMERCIAL

## 2018-04-17 VITALS
DIASTOLIC BLOOD PRESSURE: 60 MMHG | WEIGHT: 173 LBS | SYSTOLIC BLOOD PRESSURE: 142 MMHG | TEMPERATURE: 97.9 F | BODY MASS INDEX: 27.92 KG/M2 | HEART RATE: 72 BPM | RESPIRATION RATE: 18 BRPM

## 2018-04-17 DIAGNOSIS — I10 BENIGN ESSENTIAL HYPERTENSION: ICD-10-CM

## 2018-04-17 DIAGNOSIS — J41.8 MIXED SIMPLE AND MUCOPURULENT CHRONIC BRONCHITIS (HCC): ICD-10-CM

## 2018-04-17 DIAGNOSIS — I73.9 PERIPHERAL VASCULAR DISEASE (HCC): ICD-10-CM

## 2018-04-17 DIAGNOSIS — I25.10 CORONARY ARTERY DISEASE WITHOUT ANGINA PECTORIS, UNSPECIFIED VESSEL OR LESION TYPE, UNSPECIFIED WHETHER NATIVE OR TRANSPLANTED HEART: Primary | ICD-10-CM

## 2018-04-17 DIAGNOSIS — E03.9 ACQUIRED HYPOTHYROIDISM: Primary | ICD-10-CM

## 2018-04-17 DIAGNOSIS — C67.9 MALIGNANT NEOPLASM OF URINARY BLADDER, UNSPECIFIED SITE (HCC): ICD-10-CM

## 2018-04-17 PROCEDURE — 99214 OFFICE O/P EST MOD 30 MIN: CPT | Performed by: FAMILY MEDICINE

## 2018-04-17 RX ORDER — LEVOTHYROXINE SODIUM 175 UG/1
175 TABLET ORAL DAILY
Qty: 90 TABLET | Refills: 3 | Status: SHIPPED | OUTPATIENT
Start: 2018-04-17 | End: 2019-05-11 | Stop reason: SDUPTHER

## 2018-04-17 RX ORDER — SIMVASTATIN 80 MG
80 TABLET ORAL
COMMUNITY
End: 2018-10-17 | Stop reason: SDUPTHER
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
## 2018-04-17 NOTE — PATIENT INSTRUCTIONS
Continue medications  F/U pulmonary, Cardiology  Repeat labs in 4 months   Ponaris [id number] daily    Recent Results (from the past 1344 hour(s))   Comprehensive metabolic panel    Collection Time: 04/14/18  7:41 AM   Result Value Ref Range    SL AMB GLUCOSE 96 65 - 99 mg/dL    BUN 19 8 - 27 mg/dL    Creatinine, Serum 1 10 0 76 - 1 27 mg/dL    eGFR Non  61 >59 mL/min/1 73    SL AMB EGFR AFRICAN AMERICAN 71 >59 mL/min/1 73    SL AMB BUN/CREATININE RATIO 17 10 - 24    SL AMB SODIUM 140 134 - 144 mmol/L    SL AMB POTASSIUM 4 0 3 5 - 5 2 mmol/L    SL AMB CHLORIDE 99 96 - 106 mmol/L    SL AMB CARBON DIOXIDE 26 18 - 29 mmol/L    CALCIUM 9 3 8 6 - 10 2 mg/dL    SL AMB PROTEIN, TOTAL 6 2 6 0 - 8 5 g/dL    Serum Albumin 4 1 3 5 - 4 7 g/dL    Globulin, Total 2 1 1 5 - 4 5 g/dL    SL AMB ALBUMIN/GLOBULIN RATIO 2 0 1 2 - 2 2    SL AMB BILIRUBIN, TOTAL 0 3 0 0 - 1 2 mg/dL    Alk Phos Isoenzymes 52 39 - 117 IU/L    SL AMB AST 28 0 - 40 IU/L    SL AMB ALT 22 0 - 44 IU/L   LIPID PANEL WITH CHOL/HDL RATIO    Collection Time: 04/14/18  7:41 AM   Result Value Ref Range    Cholesterol, Total 225 (H) 100 - 199 mg/dL    Triglycerides 267 (H) 0 - 149 mg/dL    SL AMB HDL CHOLESTEROL 50 >39 mg/dL    SL AMB VLDL CHOLESTEROL CALC 53 (H) 5 - 40 mg/dL    SL AMB LDL-CHOLESTEROL 122 (H) 0 - 99 mg/dL    T   Chol/HDL Ratio 4 5 0 0 - 5 0 ratio   TSH, 3rd generation    Collection Time: 04/14/18  7:41 AM   Result Value Ref Range    TSH 12 550 (H) 0 450 - 4 500 uIU/mL   PSA Total, Diagnostic    Collection Time: 04/14/18  7:41 AM   Result Value Ref Range    Prostate Specific Antigen Total <0 1 0 0 - 4 0 ng/mL

## 2018-04-24 ENCOUNTER — HOSPITAL ENCOUNTER (OUTPATIENT)
Dept: RADIOLOGY | Facility: HOSPITAL | Age: 83
Discharge: HOME | End: 2018-04-24
Payer: COMMERCIAL

## 2018-04-24 ENCOUNTER — TRANSCRIBE ORDERS (OUTPATIENT)
Dept: ADMINISTRATIVE | Facility: HOSPITAL | Age: 83
End: 2018-04-24

## 2018-04-24 ENCOUNTER — OFFICE VISIT (OUTPATIENT)
Dept: PULMONOLOGY | Facility: MEDICAL CENTER | Age: 83
End: 2018-04-24
Payer: COMMERCIAL

## 2018-04-24 VITALS
HEART RATE: 68 BPM | SYSTOLIC BLOOD PRESSURE: 138 MMHG | OXYGEN SATURATION: 96 % | BODY MASS INDEX: 28.12 KG/M2 | DIASTOLIC BLOOD PRESSURE: 78 MMHG | WEIGHT: 175 LBS | TEMPERATURE: 97.5 F | RESPIRATION RATE: 12 BRPM | HEIGHT: 66 IN

## 2018-04-24 DIAGNOSIS — J30.0 VASOMOTOR RHINITIS: ICD-10-CM

## 2018-04-24 DIAGNOSIS — J43.9 BULLOUS EMPHYSEMA (HCC): ICD-10-CM

## 2018-04-24 DIAGNOSIS — R05.9 COUGH: ICD-10-CM

## 2018-04-24 DIAGNOSIS — J44.9 CHRONIC OBSTRUCTIVE PULMONARY DISEASE, UNSPECIFIED COPD TYPE (HCC): Primary | ICD-10-CM

## 2018-04-24 DIAGNOSIS — G47.33 OBSTRUCTIVE SLEEP APNEA: ICD-10-CM

## 2018-04-24 PROCEDURE — 99204 OFFICE O/P NEW MOD 45 MIN: CPT | Performed by: NURSE PRACTITIONER

## 2018-04-24 PROCEDURE — 94010 BREATHING CAPACITY TEST: CPT | Performed by: NURSE PRACTITIONER

## 2018-04-24 PROCEDURE — 71046 X-RAY EXAM CHEST 2 VIEWS: CPT

## 2018-04-24 RX ORDER — IPRATROPIUM BROMIDE 21 UG/1
2 SPRAY, METERED NASAL EVERY 12 HOURS
Qty: 30 ML | Refills: 1 | Status: SHIPPED | OUTPATIENT
Start: 2018-04-24 | End: 2018-10-23 | Stop reason: SDUPTHER

## 2018-04-24 NOTE — ASSESSMENT & PLAN NOTE
Micki Ovlera has constant running nose  According to patient it is clear and happens mostly in the morning after he awakens  He has not been able to use his CPAP for obstructive sleep apnea because he feels that this could be perhaps exacerbating  I do think Micki Olvera has an element of allergic rhinitis as well  I am ordering ipratropium nasal spray 0 3% to be used  I also instructed him to use fluticasone nasal spray 1 spray in each nostril approximately 0 5 hour he after he uses ipratropium

## 2018-04-24 NOTE — ASSESSMENT & PLAN NOTE
Eron Diamond has bullous emphysema  I did review his CT of chest in September of 2007  There right apical postsurgical changes with some mild emphysema noted  There was no PE  This was a CT a exam  There has been no further chest x-ray  PFT done today reveals severe restriction  FVC was 1 31 L or 40% of predicted, FEV1 is 1 08 L or 48% of predicted obstruction ratio is 83%  Severe restriction is noted on flow volume loop  According to patient, he is using Spiriva Handihaler 1 puff daily

## 2018-04-24 NOTE — ASSESSMENT & PLAN NOTE
Katie Braswell has history of obstructive sleep apnea  Currently I do not have his diagnostic test her treatment titration  It appears that he was diagnosed in September of 2013  According to his [email or web address] company he is currently using auto CPAP for over 16 cm of water pressure  According to patient he has not been able to use because of copious nasal drainage in the morning  Plan includes contacting DME company for compliance data

## 2018-04-24 NOTE — PATIENT INSTRUCTIONS
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
## 2018-05-02 ENCOUNTER — OFFICE VISIT (OUTPATIENT)
Dept: FAMILY MEDICINE CLINIC | Facility: CLINIC | Age: 83
End: 2018-05-02
Payer: COMMERCIAL

## 2018-05-02 VITALS
WEIGHT: 175 LBS | RESPIRATION RATE: 16 BRPM | TEMPERATURE: 98.2 F | SYSTOLIC BLOOD PRESSURE: 102 MMHG | DIASTOLIC BLOOD PRESSURE: 60 MMHG | BODY MASS INDEX: 28.03 KG/M2 | HEART RATE: 72 BPM

## 2018-05-02 DIAGNOSIS — J20.9 BRONCHITIS WITH BRONCHOSPASM: ICD-10-CM

## 2018-05-02 DIAGNOSIS — M79.10 MYALGIA: Primary | ICD-10-CM

## 2018-05-02 DIAGNOSIS — E03.9 ACQUIRED HYPOTHYROIDISM: ICD-10-CM

## 2018-05-02 DIAGNOSIS — I73.9 PERIPHERAL VASCULAR DISEASE (HCC): ICD-10-CM

## 2018-05-02 DIAGNOSIS — M41.9 SCOLIOSIS, UNSPECIFIED SCOLIOSIS TYPE, UNSPECIFIED SPINAL REGION: ICD-10-CM

## 2018-05-02 DIAGNOSIS — I10 BENIGN ESSENTIAL HYPERTENSION: ICD-10-CM

## 2018-05-02 PROCEDURE — 99214 OFFICE O/P EST MOD 30 MIN: CPT | Performed by: FAMILY MEDICINE

## 2018-05-02 PROCEDURE — 3074F SYST BP LT 130 MM HG: CPT | Performed by: FAMILY MEDICINE

## 2018-05-02 PROCEDURE — 3078F DIAST BP <80 MM HG: CPT | Performed by: FAMILY MEDICINE
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
## 2018-05-03 LAB
ALBUMIN SERPL-MCNC: 4 G/DL (ref 3.5–4.7)
ALBUMIN/GLOB SERPL: 1.7 {RATIO} (ref 1.2–2.2)
ALDOLASE SERPL-CCNC: 6.2 U/L (ref 3.3–10.3)
ALP SERPL-CCNC: 47 IU/L (ref 39–117)
ALT SERPL-CCNC: 20 IU/L (ref 0–44)
AMBIG ABBREV DEFAULT: NORMAL
AST SERPL-CCNC: 33 IU/L (ref 0–40)
BASOPHILS # BLD AUTO: 0 X10E3/UL (ref 0–0.2)
BASOPHILS NFR BLD AUTO: 1 %
BILIRUB SERPL-MCNC: 0.4 MG/DL (ref 0–1.2)
BUN SERPL-MCNC: 25 MG/DL (ref 8–27)
BUN/CREAT SERPL: 23 (ref 10–24)
CALCIUM SERPL-MCNC: 9.5 MG/DL (ref 8.6–10.2)
CHLORIDE SERPL-SCNC: 97 MMOL/L (ref 96–106)
CK MB SERPL-MCNC: 8.2 NG/ML (ref 0–10.4)
CK SERPL-CCNC: 230 U/L (ref 24–204)
CO2 SERPL-SCNC: 25 MMOL/L (ref 18–29)
CREAT SERPL-MCNC: 1.11 MG/DL (ref 0.76–1.27)
EOSINOPHIL # BLD AUTO: 0.3 X10E3/UL (ref 0–0.4)
EOSINOPHIL NFR BLD AUTO: 5 %
ERYTHROCYTE [DISTWIDTH] IN BLOOD BY AUTOMATED COUNT: 14.5 % (ref 12.3–15.4)
ERYTHROCYTE [SEDIMENTATION RATE] IN BLOOD BY WESTERGREN METHOD: 4 MM/HR (ref 0–30)
GLOBULIN SER-MCNC: 2.4 G/DL (ref 1.5–4.5)
GLUCOSE SERPL-MCNC: 96 MG/DL (ref 65–99)
HCT VFR BLD AUTO: 42.7 % (ref 37.5–51)
HGB BLD-MCNC: 13.9 G/DL (ref 13–17.7)
IMM GRANULOCYTES # BLD: 0 X10E3/UL (ref 0–0.1)
IMM GRANULOCYTES NFR BLD: 0 %
LYMPHOCYTES # BLD AUTO: 1.6 X10E3/UL (ref 0.7–3.1)
LYMPHOCYTES NFR BLD AUTO: 26 %
MCH RBC QN AUTO: 33.2 PG (ref 26.6–33)
MCHC RBC AUTO-ENTMCNC: 32.6 G/DL (ref 31.5–35.7)
MCV RBC AUTO: 102 FL (ref 79–97)
MONOCYTES # BLD AUTO: 0.5 X10E3/UL (ref 0.1–0.9)
MONOCYTES NFR BLD AUTO: 7 %
NEUTROPHILS # BLD AUTO: 3.8 X10E3/UL (ref 1.4–7)
NEUTROPHILS NFR BLD AUTO: 61 %
PLATELET # BLD AUTO: 184 X10E3/UL (ref 150–379)
POTASSIUM SERPL-SCNC: 4.7 MMOL/L (ref 3.5–5.2)
PROT SERPL-MCNC: 6.4 G/DL (ref 6–8.5)
RBC # BLD AUTO: 4.19 X10E6/UL (ref 4.14–5.8)
SL AMB EGFR AFRICAN AMERICAN: 70 ML/MIN/1.73
SL AMB EGFR NON AFRICAN AMERICAN: 61 ML/MIN/1.73
SODIUM SERPL-SCNC: 139 MMOL/L (ref 134–144)
TSH SERPL DL<=0.005 MIU/L-ACNC: 9.26 UIU/ML (ref 0.45–4.5)
WBC # BLD AUTO: 6.2 X10E3/UL (ref 3.4–10.8)

## 2018-07-12 DIAGNOSIS — E78.2 MIXED HYPERLIPIDEMIA: Primary | ICD-10-CM

## 2018-07-12 RX ORDER — SIMVASTATIN 40 MG
TABLET ORAL
Qty: 30 TABLET | Refills: 11 | Status: SHIPPED | OUTPATIENT
Start: 2018-07-12 | End: 2018-08-14 | Stop reason: DRUGHIGH

## 2018-07-16 ENCOUNTER — TELEPHONE (OUTPATIENT)
Dept: FAMILY MEDICINE CLINIC | Facility: CLINIC | Age: 83
End: 2018-07-16

## 2018-07-16 NOTE — TELEPHONE ENCOUNTER
Pt is confused about his Simvastatin 80mg or 40mg  Pts cardiologist changed his dose  Pt will confirm with cardiology and call me back

## 2018-07-17 DIAGNOSIS — E03.9 ACQUIRED HYPOTHYROIDISM: Primary | ICD-10-CM

## 2018-07-17 NOTE — TELEPHONE ENCOUNTER
Alfredo Moore states no one has given him a call back  Called Dr Saba [place] in [place] , spoke with Josie Salazar advise that  As of 7/12/18  Pt is suppose to be taking Simvastatin 80mg 1 tablet daily  called pt back to inform states  He has 2  Different set of instructions  One pill bottle states  40mg take 2 tablets daily  And the other states 80mg take 1 tablet daily  Advised pt  It is the same dose as per instructions  jsut has to make sure that if he takes 40mg to take 2 tablets once a day and  If  Taking 80 mg  To only take 1 tablet daily  pt understood, no further action is required  af/rma

## 2018-07-17 NOTE — TELEPHONE ENCOUNTER
Spoke with pt states called cardiology is still waiting on a call back  will call office when he gets confirmation from cardio     advise pt I would call him back this afternoon if we don't hear anything to make sure pt  Has gotten some clarification on the medication dosage  af/rma

## 2018-08-04 LAB — TSH SERPL DL<=0.005 MIU/L-ACNC: 29.85 UIU/ML (ref 0.45–4.5)

## 2018-08-13 DIAGNOSIS — E03.9 ACQUIRED HYPOTHYROIDISM: ICD-10-CM

## 2018-08-13 RX ORDER — LEVOTHYROXINE SODIUM 175 UG/1
175 TABLET ORAL DAILY
Qty: 90 TABLET | Refills: 1 | Status: SHIPPED | OUTPATIENT
Start: 2018-08-13 | End: 2019-04-03

## 2018-08-14 ENCOUNTER — TELEPHONE (OUTPATIENT)
Dept: FAMILY MEDICINE CLINIC | Facility: CLINIC | Age: 83
End: 2018-08-14

## 2018-08-14 NOTE — TELEPHONE ENCOUNTER
Pt called as he was confused about his Simvastatin again  He has been taking both 80mg and 40mg  I explained we confirmed with Dr Hazel Show office that he is to take 80mg daily not 40mg  Pt states he wrote it down this time  I also confirmed with him that he picked up the new rx for Levothyroxine 175 to take daily  Pt confirmed he has it and will take it daily

## 2018-08-16 ENCOUNTER — OFFICE VISIT (OUTPATIENT)
Dept: PODIATRY | Facility: CLINIC | Age: 83
End: 2018-08-16
Payer: COMMERCIAL

## 2018-08-16 VITALS
WEIGHT: 175 LBS | HEART RATE: 65 BPM | HEIGHT: 66 IN | BODY MASS INDEX: 28.12 KG/M2 | DIASTOLIC BLOOD PRESSURE: 82 MMHG | SYSTOLIC BLOOD PRESSURE: 132 MMHG

## 2018-08-16 DIAGNOSIS — L84 CORNS: ICD-10-CM

## 2018-08-16 DIAGNOSIS — M77.42 METATARSALGIA OF BOTH FEET: ICD-10-CM

## 2018-08-16 DIAGNOSIS — M21.961 ACQUIRED DEFORMITY OF RIGHT FOOT: ICD-10-CM

## 2018-08-16 DIAGNOSIS — M77.41 METATARSALGIA OF BOTH FEET: ICD-10-CM

## 2018-08-16 DIAGNOSIS — M25.571 ARTHRALGIA OF RIGHT FOOT: Primary | ICD-10-CM

## 2018-08-16 PROCEDURE — 99202 OFFICE O/P NEW SF 15 MIN: CPT | Performed by: PODIATRIST

## 2018-08-16 PROCEDURE — 29540 STRAPPING ANKLE &/FOOT: CPT | Performed by: PODIATRIST

## 2018-08-16 PROCEDURE — 20605 DRAIN/INJ JOINT/BURSA W/O US: CPT | Performed by: PODIATRIST
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
## 2018-08-29 DIAGNOSIS — I25.10 CORONARY ARTERIOSCLEROSIS: Primary | ICD-10-CM

## 2018-08-29 DIAGNOSIS — Q24.5 CORONARY ARTERY ABNORMALITY: Primary | ICD-10-CM

## 2018-08-29 RX ORDER — CLOPIDOGREL BISULFATE 75 MG/1
75 TABLET ORAL DAILY
Qty: 90 TABLET | Refills: 3 | Status: SHIPPED | OUTPATIENT
Start: 2018-08-29 | End: 2019-11-04 | Stop reason: SDUPTHER

## 2018-10-17 ENCOUNTER — TELEPHONE (OUTPATIENT)
Dept: FAMILY MEDICINE CLINIC | Facility: CLINIC | Age: 83
End: 2018-10-17

## 2018-10-17 DIAGNOSIS — E78.2 MIXED HYPERLIPIDEMIA: Primary | ICD-10-CM

## 2018-10-17 DIAGNOSIS — F41.8 ANXIETY WITH DEPRESSION: Primary | ICD-10-CM

## 2018-10-17 RX ORDER — SIMVASTATIN 80 MG
80 TABLET ORAL
Qty: 90 TABLET | Refills: 3 | Status: SHIPPED | OUTPATIENT
Start: 2018-10-17 | End: 2019-11-04 | Stop reason: SDUPTHER

## 2018-10-17 RX ORDER — SERTRALINE HYDROCHLORIDE 25 MG/1
50 TABLET, FILM COATED ORAL DAILY
Qty: 90 TABLET | Refills: 3 | Status: SHIPPED | OUTPATIENT
Start: 2018-10-17 | End: 2018-10-18 | Stop reason: DRUGHIGH

## 2018-10-17 NOTE — TELEPHONE ENCOUNTER
Patient called  States used to take Zoloft, and would like to know if he could take it again  af/rma

## 2018-10-18 ENCOUNTER — TELEPHONE (OUTPATIENT)
Dept: FAMILY MEDICINE CLINIC | Facility: CLINIC | Age: 83
End: 2018-10-18

## 2018-10-18 DIAGNOSIS — F41.8 ANXIETY WITH DEPRESSION: Primary | ICD-10-CM

## 2018-10-18 NOTE — TELEPHONE ENCOUNTER
Insurance will only cover Sertraline 1 5 tablets a day      can you please change Sertraline script to 50 mg 1 tablet daily  Instead of 25 mg 2 tablets daily

## 2018-10-23 ENCOUNTER — OFFICE VISIT (OUTPATIENT)
Dept: PULMONOLOGY | Facility: MEDICAL CENTER | Age: 83
End: 2018-10-23
Payer: COMMERCIAL

## 2018-10-23 VITALS
HEIGHT: 68 IN | TEMPERATURE: 97.4 F | OXYGEN SATURATION: 91 % | HEART RATE: 64 BPM | RESPIRATION RATE: 16 BRPM | DIASTOLIC BLOOD PRESSURE: 84 MMHG | BODY MASS INDEX: 26.07 KG/M2 | SYSTOLIC BLOOD PRESSURE: 140 MMHG | WEIGHT: 172 LBS

## 2018-10-23 DIAGNOSIS — J43.2 CENTRILOBULAR EMPHYSEMA (HCC): Primary | ICD-10-CM

## 2018-10-23 DIAGNOSIS — M79.10 MYALGIA: ICD-10-CM

## 2018-10-23 DIAGNOSIS — F17.210 CIGARETTE NICOTINE DEPENDENCE WITHOUT COMPLICATION: ICD-10-CM

## 2018-10-23 DIAGNOSIS — G47.33 OBSTRUCTIVE SLEEP APNEA: ICD-10-CM

## 2018-10-23 DIAGNOSIS — J30.0 VASOMOTOR RHINITIS: ICD-10-CM

## 2018-10-23 PROBLEM — J43.9 BULLOUS EMPHYSEMA (HCC): Status: RESOLVED | Noted: 2018-04-24 | Resolved: 2018-10-23

## 2018-10-23 PROCEDURE — 99214 OFFICE O/P EST MOD 30 MIN: CPT | Performed by: NURSE PRACTITIONER

## 2018-10-23 PROCEDURE — 4040F PNEUMOC VAC/ADMIN/RCVD: CPT | Performed by: NURSE PRACTITIONER

## 2018-10-23 RX ORDER — IPRATROPIUM BROMIDE 21 UG/1
2 SPRAY, METERED NASAL 2 TIMES DAILY
Qty: 30 ML | Refills: 2 | Status: SHIPPED | OUTPATIENT
Start: 2018-10-23

## 2018-10-23 NOTE — ASSESSMENT & PLAN NOTE
Brandon Pitts continues to smoke half a pack of cigarettes today  He is 80years old and is aware that likely smoking cessation would be to his benefit  However at this time he has no incentive nor plans to stop  He is aware of the detrimental effects

## 2018-10-23 NOTE — PATIENT INSTRUCTIONS
Have ordered for you Spiriva Handy haler 1 puff daily  This is the inhaler that you use the capsule with  I have also ordered for you in nasal spray for year runny nose  If you feel like you have a lot of mucus in your throat in the morning this could be due to postnasal drip  Flonase over-the-counter spray is helpful  She had use 2 sprays in each nostril at nighttime  Follow-up in 6 months  Mucinex over-the-counter is also helpful for drying secretions
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
## 2018-10-23 NOTE — ASSESSMENT & PLAN NOTE
Stephany Mccormick has history of centrilobular emphysema  Did review last CTs of the chest which was done in 2007  Did appear that he had right surgical procedure to right lung  According to Stephany Mccormick this was for early stage lung cancer  He did not undergo any radiation or chemotherapy  His last chest x-ray was done in April of 2018  There was mild hyper inflation and right suprahilar clips noted  No mass was noted  Stephany Mccormick continues to be stable  His PFT was done at his last visit that showed restrictive defect seen on flow volume loop  This is likely secondary to lobectomy  He continues to use Spiriva 1 puff daily  He does not need rescue inhalation and rarely has shortness of breath

## 2018-10-23 NOTE — ASSESSMENT & PLAN NOTE
Patient has postnasal drip and also vasomotor rhinitis  He reports that in the a m  He has copious amounts of clear fluid that comes from his nose  I will prescribe for him ipratropium nasal spray  Also asked that he use Flonase nasal spray at nighttime as well    Likely has a combination of both postnasal drip as well as vasomotor rhinitis peer

## 2018-10-23 NOTE — ASSESSMENT & PLAN NOTE
Collettjared Kit has a history of obstructive sleep apnea  I was able to have very brief notes from his past pulmonologist   He did have an apnea link done on April 8th 18th 2013  AHI was 10 events per hour  He also had nocturnal hypoxia with oxygen below 88% for 13 min  According to patient he no longer uses CPAP  I did ask him if he would like to have a neither titration or diagnostic study in he defers

## 2019-01-25 PROBLEM — H93.8X9 EAR POPPING: Status: ACTIVE | Noted: 2017-07-26

## 2019-02-18 ENCOUNTER — OFFICE VISIT (OUTPATIENT)
Dept: FAMILY MEDICINE CLINIC | Facility: CLINIC | Age: 84
End: 2019-02-18
Payer: COMMERCIAL

## 2019-02-18 VITALS
HEIGHT: 68 IN | DIASTOLIC BLOOD PRESSURE: 70 MMHG | RESPIRATION RATE: 18 BRPM | TEMPERATURE: 96.3 F | BODY MASS INDEX: 25.16 KG/M2 | WEIGHT: 166 LBS | HEART RATE: 68 BPM | SYSTOLIC BLOOD PRESSURE: 110 MMHG

## 2019-02-18 DIAGNOSIS — J41.8 MIXED SIMPLE AND MUCOPURULENT CHRONIC BRONCHITIS (HCC): ICD-10-CM

## 2019-02-18 DIAGNOSIS — I10 BENIGN ESSENTIAL HYPERTENSION: ICD-10-CM

## 2019-02-18 DIAGNOSIS — J01.00 ACUTE NON-RECURRENT MAXILLARY SINUSITIS: Primary | ICD-10-CM

## 2019-02-18 DIAGNOSIS — I25.10 CORONARY ARTERIOSCLEROSIS: ICD-10-CM

## 2019-02-18 DIAGNOSIS — C61 ADENOCARCINOMA OF PROSTATE (HCC): ICD-10-CM

## 2019-02-18 PROCEDURE — 3725F SCREEN DEPRESSION PERFORMED: CPT | Performed by: FAMILY MEDICINE

## 2019-02-18 PROCEDURE — 1160F RVW MEDS BY RX/DR IN RCRD: CPT | Performed by: FAMILY MEDICINE

## 2019-02-18 PROCEDURE — 99214 OFFICE O/P EST MOD 30 MIN: CPT | Performed by: FAMILY MEDICINE

## 2019-02-18 RX ORDER — DOXYCYCLINE HYCLATE 100 MG/1
100 CAPSULE ORAL EVERY 12 HOURS SCHEDULED
Qty: 14 CAPSULE | Refills: 0 | Status: SHIPPED | OUTPATIENT
Start: 2019-02-18 | End: 2019-02-25
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
## 2019-03-25 ENCOUNTER — HOSPITAL ENCOUNTER (INPATIENT)
Facility: HOSPITAL | Age: 84
LOS: 4 days | Discharge: HOME HEALTH | DRG: 192 | End: 2019-03-29
Attending: EMERGENCY MEDICINE | Admitting: STUDENT IN AN ORGANIZED HEALTH CARE EDUCATION/TRAINING PROGRAM
Payer: COMMERCIAL

## 2019-03-25 ENCOUNTER — TELEPHONE (OUTPATIENT)
Dept: FAMILY MEDICINE CLINIC | Facility: CLINIC | Age: 84
End: 2019-03-25

## 2019-03-25 ENCOUNTER — APPOINTMENT (EMERGENCY)
Dept: RADIOLOGY | Facility: HOSPITAL | Age: 84
DRG: 192 | End: 2019-03-25
Payer: COMMERCIAL

## 2019-03-25 DIAGNOSIS — R77.8 TROPONIN LEVEL ELEVATED: ICD-10-CM

## 2019-03-25 DIAGNOSIS — B37.9 CANDIDIASIS: ICD-10-CM

## 2019-03-25 DIAGNOSIS — J44.1 CHRONIC OBSTRUCTIVE PULMONARY DISEASE WITH ACUTE EXACERBATION (HCC): ICD-10-CM

## 2019-03-25 DIAGNOSIS — J41.8 MIXED SIMPLE AND MUCOPURULENT CHRONIC BRONCHITIS (HCC): ICD-10-CM

## 2019-03-25 DIAGNOSIS — K21.9 GERD (GASTROESOPHAGEAL REFLUX DISEASE): ICD-10-CM

## 2019-03-25 DIAGNOSIS — R07.9 CHEST PAIN: ICD-10-CM

## 2019-03-25 DIAGNOSIS — I25.10 CORONARY ARTERY DISEASE INVOLVING NATIVE CORONARY ARTERY OF NATIVE HEART WITHOUT ANGINA PECTORIS: ICD-10-CM

## 2019-03-25 DIAGNOSIS — R09.02 HYPOXIA: ICD-10-CM

## 2019-03-25 DIAGNOSIS — J44.1 COPD EXACERBATION (HCC): Primary | ICD-10-CM

## 2019-03-25 DIAGNOSIS — J43.2 CENTRILOBULAR EMPHYSEMA (HCC): ICD-10-CM

## 2019-03-25 DIAGNOSIS — F17.210 CIGARETTE NICOTINE DEPENDENCE WITHOUT COMPLICATION: ICD-10-CM

## 2019-03-25 DIAGNOSIS — G47.33 OBSTRUCTIVE SLEEP APNEA: ICD-10-CM

## 2019-03-25 PROBLEM — D72.829 LEUKOCYTOSIS: Status: ACTIVE | Noted: 2019-03-25

## 2019-03-25 PROBLEM — J20.9 BRONCHITIS WITH BRONCHOSPASM: Status: RESOLVED | Noted: 2018-01-26 | Resolved: 2019-03-25

## 2019-03-25 PROBLEM — H93.8X9 EAR POPPING: Status: RESOLVED | Noted: 2017-07-26 | Resolved: 2019-03-25

## 2019-03-25 PROBLEM — J01.00 ACUTE NON-RECURRENT MAXILLARY SINUSITIS: Status: RESOLVED | Noted: 2019-02-18 | Resolved: 2019-03-25

## 2019-03-25 PROBLEM — R42 DIZZINESS: Status: RESOLVED | Noted: 2017-07-17 | Resolved: 2019-03-25

## 2019-03-25 PROBLEM — M79.10 MYALGIA: Status: RESOLVED | Noted: 2018-05-02 | Resolved: 2019-03-25

## 2019-03-25 LAB
ALBUMIN SERPL BCP-MCNC: 3.5 G/DL (ref 3.5–5)
ALP SERPL-CCNC: 62 U/L (ref 46–116)
ALT SERPL W P-5'-P-CCNC: 29 U/L (ref 12–78)
AMORPH PHOS CRY URNS QL MICRO: ABNORMAL /HPF
ANION GAP SERPL CALCULATED.3IONS-SCNC: 9 MMOL/L (ref 4–13)
APTT PPP: 28 SECONDS (ref 24–33)
ARTERIAL PATENCY WRIST A: YES
AST SERPL W P-5'-P-CCNC: 26 U/L (ref 5–45)
BACTERIA UR QL AUTO: ABNORMAL /HPF
BASE EXCESS BLDA CALC-SCNC: 1.4 MMOL/L
BASOPHILS # BLD AUTO: 0.03 THOUSANDS/ΜL (ref 0–0.1)
BASOPHILS NFR BLD AUTO: 0 % (ref 0–1)
BILIRUB SERPL-MCNC: 0.8 MG/DL (ref 0.2–1)
BILIRUB UR QL STRIP: NEGATIVE
BODY TEMPERATURE: 97.6 DEGREES FEHRENHEIT
BUN SERPL-MCNC: 24 MG/DL (ref 5–25)
CALCIUM SERPL-MCNC: 9.8 MG/DL (ref 8.3–10.1)
CHLORIDE SERPL-SCNC: 101 MMOL/L (ref 100–108)
CLARITY UR: ABNORMAL
CO2 SERPL-SCNC: 30 MMOL/L (ref 21–32)
COLOR UR: YELLOW
CREAT SERPL-MCNC: 1.2 MG/DL (ref 0.6–1.3)
EOSINOPHIL # BLD AUTO: 0.16 THOUSAND/ΜL (ref 0–0.61)
EOSINOPHIL NFR BLD AUTO: 1 % (ref 0–6)
ERYTHROCYTE [DISTWIDTH] IN BLOOD BY AUTOMATED COUNT: 13 % (ref 11.6–15.1)
GFR SERPL CREATININE-BSD FRML MDRD: 55 ML/MIN/1.73SQ M
GLUCOSE SERPL-MCNC: 88 MG/DL (ref 65–140)
GLUCOSE UR STRIP-MCNC: NEGATIVE MG/DL
HCO3 BLDA-SCNC: 26.2 MMOL/L (ref 22–28)
HCT VFR BLD AUTO: 45 % (ref 36.5–49.3)
HGB BLD-MCNC: 14.6 G/DL (ref 12–17)
HGB UR QL STRIP.AUTO: ABNORMAL
IMM GRANULOCYTES # BLD AUTO: 0.06 THOUSAND/UL (ref 0–0.2)
IMM GRANULOCYTES NFR BLD AUTO: 1 % (ref 0–2)
INR PPP: 0.97 (ref 0.86–1.16)
KETONES UR STRIP-MCNC: NEGATIVE MG/DL
LEUKOCYTE ESTERASE UR QL STRIP: ABNORMAL
LYMPHOCYTES # BLD AUTO: 0.98 THOUSANDS/ΜL (ref 0.6–4.47)
LYMPHOCYTES NFR BLD AUTO: 9 % (ref 14–44)
MAGNESIUM SERPL-MCNC: 1.9 MG/DL (ref 1.6–2.6)
MCH RBC QN AUTO: 33.8 PG (ref 26.8–34.3)
MCHC RBC AUTO-ENTMCNC: 32.4 G/DL (ref 31.4–37.4)
MCV RBC AUTO: 104 FL (ref 82–98)
MONOCYTES # BLD AUTO: 1.27 THOUSAND/ΜL (ref 0.17–1.22)
MONOCYTES NFR BLD AUTO: 11 % (ref 4–12)
NEUTROPHILS # BLD AUTO: 8.84 THOUSANDS/ΜL (ref 1.85–7.62)
NEUTS SEG NFR BLD AUTO: 78 % (ref 43–75)
NITRITE UR QL STRIP: POSITIVE
NON VENT ROOM AIR: 21 %
NON-SQ EPI CELLS URNS QL MICRO: ABNORMAL /HPF
NRBC BLD AUTO-RTO: 0 /100 WBCS
NT-PROBNP SERPL-MCNC: 351 PG/ML
O2 CT BLDA-SCNC: 17.2 ML/DL (ref 16–23)
OXYHGB MFR BLDA: 85.6 % (ref 94–97)
PCO2 BLDA: 42.2 MM HG (ref 36–44)
PCO2 TEMP ADJ BLDA: 41.1 MM HG (ref 36–44)
PH BLD: 7.42 [PH] (ref 7.35–7.45)
PH BLDA: 7.41 [PH] (ref 7.35–7.45)
PH UR STRIP.AUTO: 9 [PH]
PHOSPHATE SERPL-MCNC: 3.1 MG/DL (ref 2.3–4.1)
PLATELET # BLD AUTO: 143 THOUSANDS/UL (ref 149–390)
PLATELET # BLD AUTO: 162 THOUSANDS/UL (ref 149–390)
PMV BLD AUTO: 10.3 FL (ref 8.9–12.7)
PMV BLD AUTO: 10.6 FL (ref 8.9–12.7)
PO2 BLD: 53.8 MM HG (ref 75–129)
PO2 BLDA: 56.1 MM HG (ref 75–129)
POTASSIUM SERPL-SCNC: 3.8 MMOL/L (ref 3.5–5.3)
PROCALCITONIN SERPL-MCNC: 0.1 NG/ML
PROT SERPL-MCNC: 7 G/DL (ref 6.4–8.2)
PROT UR STRIP-MCNC: ABNORMAL MG/DL
PROTHROMBIN TIME: 10.2 SECONDS (ref 9.4–11.7)
RBC # BLD AUTO: 4.32 MILLION/UL (ref 3.88–5.62)
RBC #/AREA URNS AUTO: ABNORMAL /HPF
SODIUM SERPL-SCNC: 140 MMOL/L (ref 136–145)
SP GR UR STRIP.AUTO: <=1.005 (ref 1–1.03)
SPECIMEN SOURCE: ABNORMAL
TRI-PHOS CRY URNS QL MICRO: ABNORMAL /HPF
TROPONIN I SERPL-MCNC: <0.02 NG/ML
TSH SERPL DL<=0.05 MIU/L-ACNC: 5.39 UIU/ML (ref 0.36–3.74)
UROBILINOGEN UR QL STRIP.AUTO: 0.2 E.U./DL
WBC # BLD AUTO: 11.34 THOUSAND/UL (ref 4.31–10.16)
WBC #/AREA URNS AUTO: ABNORMAL /HPF

## 2019-03-25 PROCEDURE — 94640 AIRWAY INHALATION TREATMENT: CPT

## 2019-03-25 PROCEDURE — 94760 N-INVAS EAR/PLS OXIMETRY 1: CPT

## 2019-03-25 PROCEDURE — 84484 ASSAY OF TROPONIN QUANT: CPT | Performed by: EMERGENCY MEDICINE

## 2019-03-25 PROCEDURE — 81001 URINALYSIS AUTO W/SCOPE: CPT | Performed by: EMERGENCY MEDICINE

## 2019-03-25 PROCEDURE — 84100 ASSAY OF PHOSPHORUS: CPT | Performed by: EMERGENCY MEDICINE

## 2019-03-25 PROCEDURE — 85025 COMPLETE CBC W/AUTO DIFF WBC: CPT | Performed by: EMERGENCY MEDICINE

## 2019-03-25 PROCEDURE — 93005 ELECTROCARDIOGRAM TRACING: CPT

## 2019-03-25 PROCEDURE — 84145 PROCALCITONIN (PCT): CPT | Performed by: STUDENT IN AN ORGANIZED HEALTH CARE EDUCATION/TRAINING PROGRAM

## 2019-03-25 PROCEDURE — 36600 WITHDRAWAL OF ARTERIAL BLOOD: CPT

## 2019-03-25 PROCEDURE — 80053 COMPREHEN METABOLIC PANEL: CPT | Performed by: EMERGENCY MEDICINE

## 2019-03-25 PROCEDURE — 83735 ASSAY OF MAGNESIUM: CPT | Performed by: EMERGENCY MEDICINE

## 2019-03-25 PROCEDURE — 99223 1ST HOSP IP/OBS HIGH 75: CPT | Performed by: STUDENT IN AN ORGANIZED HEALTH CARE EDUCATION/TRAINING PROGRAM

## 2019-03-25 PROCEDURE — 85730 THROMBOPLASTIN TIME PARTIAL: CPT | Performed by: EMERGENCY MEDICINE

## 2019-03-25 PROCEDURE — 99285 EMERGENCY DEPT VISIT HI MDM: CPT

## 2019-03-25 PROCEDURE — 94664 DEMO&/EVAL PT USE INHALER: CPT

## 2019-03-25 PROCEDURE — 71045 X-RAY EXAM CHEST 1 VIEW: CPT

## 2019-03-25 PROCEDURE — 87086 URINE CULTURE/COLONY COUNT: CPT | Performed by: STUDENT IN AN ORGANIZED HEALTH CARE EDUCATION/TRAINING PROGRAM

## 2019-03-25 PROCEDURE — 83880 ASSAY OF NATRIURETIC PEPTIDE: CPT | Performed by: EMERGENCY MEDICINE

## 2019-03-25 PROCEDURE — 82805 BLOOD GASES W/O2 SATURATION: CPT | Performed by: EMERGENCY MEDICINE

## 2019-03-25 PROCEDURE — 84443 ASSAY THYROID STIM HORMONE: CPT | Performed by: STUDENT IN AN ORGANIZED HEALTH CARE EDUCATION/TRAINING PROGRAM

## 2019-03-25 PROCEDURE — 85610 PROTHROMBIN TIME: CPT | Performed by: EMERGENCY MEDICINE

## 2019-03-25 PROCEDURE — 87081 CULTURE SCREEN ONLY: CPT | Performed by: STUDENT IN AN ORGANIZED HEALTH CARE EDUCATION/TRAINING PROGRAM

## 2019-03-25 PROCEDURE — 87070 CULTURE OTHR SPECIMN AEROBIC: CPT | Performed by: STUDENT IN AN ORGANIZED HEALTH CARE EDUCATION/TRAINING PROGRAM

## 2019-03-25 PROCEDURE — 87631 RESP VIRUS 3-5 TARGETS: CPT | Performed by: STUDENT IN AN ORGANIZED HEALTH CARE EDUCATION/TRAINING PROGRAM

## 2019-03-25 PROCEDURE — 96361 HYDRATE IV INFUSION ADD-ON: CPT

## 2019-03-25 PROCEDURE — 87205 SMEAR GRAM STAIN: CPT | Performed by: STUDENT IN AN ORGANIZED HEALTH CARE EDUCATION/TRAINING PROGRAM

## 2019-03-25 PROCEDURE — 85049 AUTOMATED PLATELET COUNT: CPT | Performed by: STUDENT IN AN ORGANIZED HEALTH CARE EDUCATION/TRAINING PROGRAM

## 2019-03-25 PROCEDURE — 96374 THER/PROPH/DIAG INJ IV PUSH: CPT

## 2019-03-25 PROCEDURE — 36415 COLL VENOUS BLD VENIPUNCTURE: CPT | Performed by: EMERGENCY MEDICINE

## 2019-03-25 RX ORDER — AMLODIPINE BESYLATE 5 MG/1
5 TABLET ORAL EVERY EVENING
Status: DISCONTINUED | OUTPATIENT
Start: 2019-03-25 | End: 2019-03-29 | Stop reason: HOSPADM

## 2019-03-25 RX ORDER — HYDROCODONE POLISTIREX AND CHLORPHENIRAMINE POLISTIREX 10; 8 MG/5ML; MG/5ML
5 SUSPENSION, EXTENDED RELEASE ORAL EVERY 12 HOURS PRN
Status: DISCONTINUED | OUTPATIENT
Start: 2019-03-25 | End: 2019-03-29 | Stop reason: HOSPADM

## 2019-03-25 RX ORDER — ASCORBIC ACID 500 MG
1000 TABLET ORAL DAILY
Status: DISCONTINUED | OUTPATIENT
Start: 2019-03-25 | End: 2019-03-29 | Stop reason: HOSPADM

## 2019-03-25 RX ORDER — ATORVASTATIN CALCIUM 40 MG/1
40 TABLET, FILM COATED ORAL
Status: DISCONTINUED | OUTPATIENT
Start: 2019-03-25 | End: 2019-03-28

## 2019-03-25 RX ORDER — GUAIFENESIN/DEXTROMETHORPHAN 100-10MG/5
5 SYRUP ORAL EVERY 4 HOURS PRN
Status: DISCONTINUED | OUTPATIENT
Start: 2019-03-25 | End: 2019-03-29 | Stop reason: HOSPADM

## 2019-03-25 RX ORDER — ASPIRIN 81 MG/1
81 TABLET, CHEWABLE ORAL DAILY
Status: DISCONTINUED | OUTPATIENT
Start: 2019-03-25 | End: 2019-03-29 | Stop reason: HOSPADM

## 2019-03-25 RX ORDER — CLOPIDOGREL BISULFATE 75 MG/1
75 TABLET ORAL DAILY
Status: DISCONTINUED | OUTPATIENT
Start: 2019-03-25 | End: 2019-03-29 | Stop reason: HOSPADM

## 2019-03-25 RX ORDER — MECLIZINE HCL 12.5 MG/1
12.5 TABLET ORAL 3 TIMES DAILY PRN
Status: DISCONTINUED | OUTPATIENT
Start: 2019-03-25 | End: 2019-03-29 | Stop reason: HOSPADM

## 2019-03-25 RX ORDER — LOSARTAN POTASSIUM 50 MG/1
50 TABLET ORAL DAILY
Status: DISCONTINUED | OUTPATIENT
Start: 2019-03-25 | End: 2019-03-29 | Stop reason: HOSPADM

## 2019-03-25 RX ORDER — NICOTINE 21 MG/24HR
1 PATCH, TRANSDERMAL 24 HOURS TRANSDERMAL DAILY
Status: DISCONTINUED | OUTPATIENT
Start: 2019-03-25 | End: 2019-03-29 | Stop reason: HOSPADM

## 2019-03-25 RX ORDER — METHYLPREDNISOLONE SODIUM SUCCINATE 125 MG/2ML
60 INJECTION, POWDER, LYOPHILIZED, FOR SOLUTION INTRAMUSCULAR; INTRAVENOUS ONCE
Status: COMPLETED | OUTPATIENT
Start: 2019-03-25 | End: 2019-03-25

## 2019-03-25 RX ORDER — ACETAMINOPHEN 325 MG/1
650 TABLET ORAL EVERY 6 HOURS PRN
Status: DISCONTINUED | OUTPATIENT
Start: 2019-03-25 | End: 2019-03-29 | Stop reason: HOSPADM

## 2019-03-25 RX ORDER — BENZONATATE 100 MG/1
200 CAPSULE ORAL 3 TIMES DAILY
Status: DISCONTINUED | OUTPATIENT
Start: 2019-03-25 | End: 2019-03-29 | Stop reason: HOSPADM

## 2019-03-25 RX ORDER — IPRATROPIUM BROMIDE 21 UG/1
2 SPRAY, METERED NASAL 2 TIMES DAILY
Status: DISCONTINUED | OUTPATIENT
Start: 2019-03-25 | End: 2019-03-25

## 2019-03-25 RX ORDER — ONDANSETRON 2 MG/ML
4 INJECTION INTRAMUSCULAR; INTRAVENOUS EVERY 6 HOURS PRN
Status: DISCONTINUED | OUTPATIENT
Start: 2019-03-25 | End: 2019-03-29 | Stop reason: HOSPADM

## 2019-03-25 RX ORDER — METHYLPREDNISOLONE SODIUM SUCCINATE 40 MG/ML
40 INJECTION, POWDER, LYOPHILIZED, FOR SOLUTION INTRAMUSCULAR; INTRAVENOUS EVERY 12 HOURS SCHEDULED
Status: DISCONTINUED | OUTPATIENT
Start: 2019-03-26 | End: 2019-03-29 | Stop reason: HOSPADM

## 2019-03-25 RX ORDER — ZOLPIDEM TARTRATE 5 MG/1
5 TABLET ORAL
Status: DISCONTINUED | OUTPATIENT
Start: 2019-03-25 | End: 2019-03-29 | Stop reason: HOSPADM

## 2019-03-25 RX ORDER — IPRATROPIUM BROMIDE AND ALBUTEROL SULFATE 2.5; .5 MG/3ML; MG/3ML
3 SOLUTION RESPIRATORY (INHALATION) EVERY 2 HOUR PRN
Status: DISCONTINUED | OUTPATIENT
Start: 2019-03-25 | End: 2019-03-29 | Stop reason: HOSPADM

## 2019-03-25 RX ORDER — METOPROLOL TARTRATE 50 MG/1
50 TABLET, FILM COATED ORAL EVERY 12 HOURS SCHEDULED
Status: DISCONTINUED | OUTPATIENT
Start: 2019-03-25 | End: 2019-03-29 | Stop reason: HOSPADM

## 2019-03-25 RX ORDER — SODIUM CHLORIDE 9 MG/ML
125 INJECTION, SOLUTION INTRAVENOUS CONTINUOUS
Status: DISCONTINUED | OUTPATIENT
Start: 2019-03-25 | End: 2019-03-25

## 2019-03-25 RX ORDER — HEPARIN SODIUM 5000 [USP'U]/ML
5000 INJECTION, SOLUTION INTRAVENOUS; SUBCUTANEOUS EVERY 8 HOURS SCHEDULED
Status: DISCONTINUED | OUTPATIENT
Start: 2019-03-25 | End: 2019-03-29 | Stop reason: HOSPADM

## 2019-03-25 RX ORDER — AZITHROMYCIN 200 MG/5ML
500 POWDER, FOR SUSPENSION ORAL EVERY 24 HOURS
Status: DISCONTINUED | OUTPATIENT
Start: 2019-03-26 | End: 2019-03-27

## 2019-03-25 RX ORDER — IPRATROPIUM BROMIDE AND ALBUTEROL SULFATE 2.5; .5 MG/3ML; MG/3ML
3 SOLUTION RESPIRATORY (INHALATION)
Status: DISCONTINUED | OUTPATIENT
Start: 2019-03-25 | End: 2019-03-29 | Stop reason: HOSPADM

## 2019-03-25 RX ADMIN — METHYLPREDNISOLONE SODIUM SUCCINATE 60 MG: 125 INJECTION, POWDER, FOR SOLUTION INTRAMUSCULAR; INTRAVENOUS at 12:54

## 2019-03-25 RX ADMIN — METOPROLOL TARTRATE 50 MG: 50 TABLET, FILM COATED ORAL at 21:57

## 2019-03-25 RX ADMIN — BENZONATATE 200 MG: 100 CAPSULE ORAL at 21:52

## 2019-03-25 RX ADMIN — HEPARIN SODIUM 5000 UNITS: 5000 INJECTION, SOLUTION INTRAVENOUS; SUBCUTANEOUS at 16:38

## 2019-03-25 RX ADMIN — IPRATROPIUM BROMIDE AND ALBUTEROL SULFATE 3 ML: 2.5; .5 SOLUTION RESPIRATORY (INHALATION) at 20:39

## 2019-03-25 RX ADMIN — HEPARIN SODIUM 5000 UNITS: 5000 INJECTION, SOLUTION INTRAVENOUS; SUBCUTANEOUS at 22:01

## 2019-03-25 RX ADMIN — IPRATROPIUM BROMIDE 0.5 MG: 0.5 SOLUTION RESPIRATORY (INHALATION) at 12:51

## 2019-03-25 RX ADMIN — LOSARTAN POTASSIUM 50 MG: 50 TABLET, FILM COATED ORAL at 16:36

## 2019-03-25 RX ADMIN — IPRATROPIUM BROMIDE 0.5 MG: 0.5 SOLUTION RESPIRATORY (INHALATION) at 14:45

## 2019-03-25 RX ADMIN — ALBUTEROL SULFATE 5 MG: 2.5 SOLUTION RESPIRATORY (INHALATION) at 12:51

## 2019-03-25 RX ADMIN — ASPIRIN 81 MG 81 MG: 81 TABLET ORAL at 16:38

## 2019-03-25 RX ADMIN — SODIUM CHLORIDE 125 ML/HR: 0.9 INJECTION, SOLUTION INTRAVENOUS at 12:48

## 2019-03-25 RX ADMIN — HYDROCODONE POLISTIREX AND CHLORPHENIRAMINE POLISTIREX 5 ML: 10; 8 SUSPENSION, EXTENDED RELEASE ORAL at 13:26

## 2019-03-25 RX ADMIN — IPRATROPIUM BROMIDE AND ALBUTEROL SULFATE 3 ML: 2.5; .5 SOLUTION RESPIRATORY (INHALATION) at 16:25

## 2019-03-25 RX ADMIN — ALBUTEROL SULFATE 5 MG: 2.5 SOLUTION RESPIRATORY (INHALATION) at 14:45

## 2019-03-25 RX ADMIN — NICOTINE 1 PATCH: 14 PATCH TRANSDERMAL at 16:33

## 2019-03-25 RX ADMIN — BENZONATATE 200 MG: 100 CAPSULE ORAL at 16:38

## 2019-03-25 RX ADMIN — ATORVASTATIN CALCIUM 40 MG: 40 TABLET, FILM COATED ORAL at 16:36

## 2019-03-25 RX ADMIN — AMLODIPINE BESYLATE 5 MG: 5 TABLET ORAL at 18:17

## 2019-03-25 RX ADMIN — AZITHROMYCIN MONOHYDRATE 500 MG: 500 INJECTION, POWDER, LYOPHILIZED, FOR SOLUTION INTRAVENOUS at 14:49

## 2019-03-25 RX ADMIN — BIMATOPROST 1 DROP: 0.1 SOLUTION/ DROPS OPHTHALMIC at 21:53

## 2019-03-25 NOTE — TELEPHONE ENCOUNTER
Pt called with SOB and disorientation  States he feels like he can't catch up  Denies chest pain and numbness in limbs  Does have some tingling in his feet since last night  Still smoking and states he had one cigarette today  Advised per Dr Nisha Pittman to go to the ER  Pt refused squad and states that he would take himself, but was hesitant because he does not want to get admitted  Told patient he needed to go for evaluation of his health and that we will know if he goes and will follow up   Corry Daily MA

## 2019-03-26 LAB
ANION GAP SERPL CALCULATED.3IONS-SCNC: 8 MMOL/L (ref 4–13)
ATRIAL RATE: 72 BPM
BACTERIA UR CULT: NORMAL
BUN SERPL-MCNC: 30 MG/DL (ref 5–25)
CALCIUM SERPL-MCNC: 9.2 MG/DL (ref 8.3–10.1)
CHLORIDE SERPL-SCNC: 103 MMOL/L (ref 100–108)
CO2 SERPL-SCNC: 27 MMOL/L (ref 21–32)
CREAT SERPL-MCNC: 1.18 MG/DL (ref 0.6–1.3)
ERYTHROCYTE [DISTWIDTH] IN BLOOD BY AUTOMATED COUNT: 13.1 % (ref 11.6–15.1)
FLUAV AG SPEC QL: NOT DETECTED
FLUBV AG SPEC QL: NOT DETECTED
GFR SERPL CREATININE-BSD FRML MDRD: 56 ML/MIN/1.73SQ M
GLUCOSE SERPL-MCNC: 114 MG/DL (ref 65–140)
HCT VFR BLD AUTO: 39.2 % (ref 36.5–49.3)
HGB BLD-MCNC: 12.4 G/DL (ref 12–17)
MAGNESIUM SERPL-MCNC: 2.1 MG/DL (ref 1.6–2.6)
MCH RBC QN AUTO: 33.3 PG (ref 26.8–34.3)
MCHC RBC AUTO-ENTMCNC: 31.6 G/DL (ref 31.4–37.4)
MCV RBC AUTO: 105 FL (ref 82–98)
P AXIS: 47 DEGREES
PLATELET # BLD AUTO: 153 THOUSANDS/UL (ref 149–390)
PMV BLD AUTO: 10.8 FL (ref 8.9–12.7)
POTASSIUM SERPL-SCNC: 4.8 MMOL/L (ref 3.5–5.3)
PR INTERVAL: 214 MS
PROCALCITONIN SERPL-MCNC: 0.08 NG/ML
QRS AXIS: -50 DEGREES
QRSD INTERVAL: 92 MS
QT INTERVAL: 378 MS
QTC INTERVAL: 413 MS
RBC # BLD AUTO: 3.72 MILLION/UL (ref 3.88–5.62)
RSV B RNA SPEC QL NAA+PROBE: NOT DETECTED
SODIUM SERPL-SCNC: 138 MMOL/L (ref 136–145)
T WAVE AXIS: 67 DEGREES
VENTRICULAR RATE: 72 BPM
WBC # BLD AUTO: 14.46 THOUSAND/UL (ref 4.31–10.16)

## 2019-03-26 PROCEDURE — 97162 PT EVAL MOD COMPLEX 30 MIN: CPT

## 2019-03-26 PROCEDURE — G8987 SELF CARE CURRENT STATUS: HCPCS

## 2019-03-26 PROCEDURE — 99232 SBSQ HOSP IP/OBS MODERATE 35: CPT | Performed by: INTERNAL MEDICINE

## 2019-03-26 PROCEDURE — 97166 OT EVAL MOD COMPLEX 45 MIN: CPT

## 2019-03-26 PROCEDURE — G8978 MOBILITY CURRENT STATUS: HCPCS

## 2019-03-26 PROCEDURE — 99221 1ST HOSP IP/OBS SF/LOW 40: CPT | Performed by: INTERNAL MEDICINE

## 2019-03-26 PROCEDURE — 93010 ELECTROCARDIOGRAM REPORT: CPT | Performed by: INTERNAL MEDICINE

## 2019-03-26 PROCEDURE — 97535 SELF CARE MNGMENT TRAINING: CPT

## 2019-03-26 PROCEDURE — 85027 COMPLETE CBC AUTOMATED: CPT | Performed by: STUDENT IN AN ORGANIZED HEALTH CARE EDUCATION/TRAINING PROGRAM

## 2019-03-26 PROCEDURE — 94668 MNPJ CHEST WALL SBSQ: CPT

## 2019-03-26 PROCEDURE — G8988 SELF CARE GOAL STATUS: HCPCS

## 2019-03-26 PROCEDURE — 94664 DEMO&/EVAL PT USE INHALER: CPT

## 2019-03-26 PROCEDURE — 94760 N-INVAS EAR/PLS OXIMETRY 1: CPT

## 2019-03-26 PROCEDURE — 84145 PROCALCITONIN (PCT): CPT | Performed by: STUDENT IN AN ORGANIZED HEALTH CARE EDUCATION/TRAINING PROGRAM

## 2019-03-26 PROCEDURE — G8979 MOBILITY GOAL STATUS: HCPCS

## 2019-03-26 PROCEDURE — 97530 THERAPEUTIC ACTIVITIES: CPT

## 2019-03-26 PROCEDURE — 94640 AIRWAY INHALATION TREATMENT: CPT

## 2019-03-26 PROCEDURE — 80048 BASIC METABOLIC PNL TOTAL CA: CPT | Performed by: STUDENT IN AN ORGANIZED HEALTH CARE EDUCATION/TRAINING PROGRAM

## 2019-03-26 PROCEDURE — 83735 ASSAY OF MAGNESIUM: CPT | Performed by: STUDENT IN AN ORGANIZED HEALTH CARE EDUCATION/TRAINING PROGRAM

## 2019-03-26 RX ADMIN — AZITHROMYCIN 500 MG: 200 POWDER, FOR SUSPENSION ORAL at 10:13

## 2019-03-26 RX ADMIN — METHYLPREDNISOLONE SODIUM SUCCINATE 40 MG: 40 INJECTION, POWDER, FOR SOLUTION INTRAMUSCULAR; INTRAVENOUS at 21:45

## 2019-03-26 RX ADMIN — NICOTINE 1 PATCH: 14 PATCH TRANSDERMAL at 09:58

## 2019-03-26 RX ADMIN — IPRATROPIUM BROMIDE AND ALBUTEROL SULFATE 3 ML: 2.5; .5 SOLUTION RESPIRATORY (INHALATION) at 15:17

## 2019-03-26 RX ADMIN — IPRATROPIUM BROMIDE AND ALBUTEROL SULFATE 3 ML: 2.5; .5 SOLUTION RESPIRATORY (INHALATION) at 11:33

## 2019-03-26 RX ADMIN — HEPARIN SODIUM 5000 UNITS: 5000 INJECTION, SOLUTION INTRAVENOUS; SUBCUTANEOUS at 06:09

## 2019-03-26 RX ADMIN — BENZONATATE 200 MG: 100 CAPSULE ORAL at 21:45

## 2019-03-26 RX ADMIN — BIMATOPROST 1 DROP: 0.1 SOLUTION/ DROPS OPHTHALMIC at 21:48

## 2019-03-26 RX ADMIN — GUAIFENESIN AND DEXTROMETHORPHAN 5 ML: 100; 10 SYRUP ORAL at 16:10

## 2019-03-26 RX ADMIN — HEPARIN SODIUM 5000 UNITS: 5000 INJECTION, SOLUTION INTRAVENOUS; SUBCUTANEOUS at 16:10

## 2019-03-26 RX ADMIN — HEPARIN SODIUM 5000 UNITS: 5000 INJECTION, SOLUTION INTRAVENOUS; SUBCUTANEOUS at 22:01

## 2019-03-26 RX ADMIN — METHYLPREDNISOLONE SODIUM SUCCINATE 40 MG: 40 INJECTION, POWDER, FOR SOLUTION INTRAMUSCULAR; INTRAVENOUS at 09:56

## 2019-03-26 RX ADMIN — ATORVASTATIN CALCIUM 40 MG: 40 TABLET, FILM COATED ORAL at 16:09

## 2019-03-26 RX ADMIN — METOPROLOL TARTRATE 50 MG: 50 TABLET, FILM COATED ORAL at 21:45

## 2019-03-26 RX ADMIN — IPRATROPIUM BROMIDE AND ALBUTEROL SULFATE 3 ML: 2.5; .5 SOLUTION RESPIRATORY (INHALATION) at 23:32

## 2019-03-26 RX ADMIN — IPRATROPIUM BROMIDE AND ALBUTEROL SULFATE 3 ML: 2.5; .5 SOLUTION RESPIRATORY (INHALATION) at 07:25

## 2019-03-26 RX ADMIN — SERTRALINE HYDROCHLORIDE 50 MG: 50 TABLET ORAL at 09:58

## 2019-03-26 RX ADMIN — BENZONATATE 200 MG: 100 CAPSULE ORAL at 16:09

## 2019-03-26 RX ADMIN — IPRATROPIUM BROMIDE AND ALBUTEROL SULFATE 3 ML: 2.5; .5 SOLUTION RESPIRATORY (INHALATION) at 19:55

## 2019-03-26 RX ADMIN — CLOPIDOGREL BISULFATE 75 MG: 75 TABLET ORAL at 09:57

## 2019-03-26 RX ADMIN — BENZONATATE 200 MG: 100 CAPSULE ORAL at 10:13

## 2019-03-26 RX ADMIN — LEVOTHYROXINE SODIUM 175 MCG: 150 TABLET ORAL at 05:54

## 2019-03-26 RX ADMIN — OXYCODONE HYDROCHLORIDE AND ACETAMINOPHEN 1000 MG: 500 TABLET ORAL at 09:57

## 2019-03-26 RX ADMIN — AMLODIPINE BESYLATE 5 MG: 5 TABLET ORAL at 18:30

## 2019-03-26 RX ADMIN — ASPIRIN 81 MG 81 MG: 81 TABLET ORAL at 09:57

## 2019-03-27 LAB
ANION GAP SERPL CALCULATED.3IONS-SCNC: 11 MMOL/L (ref 4–13)
BACTERIA SPT RESP CULT: ABNORMAL
BACTERIA SPT RESP CULT: ABNORMAL
BASOPHILS # BLD AUTO: 0.01 THOUSANDS/ΜL (ref 0–0.1)
BASOPHILS NFR BLD AUTO: 0 % (ref 0–1)
BUN SERPL-MCNC: 40 MG/DL (ref 5–25)
CALCIUM SERPL-MCNC: 9.2 MG/DL (ref 8.3–10.1)
CHLORIDE SERPL-SCNC: 101 MMOL/L (ref 100–108)
CO2 SERPL-SCNC: 24 MMOL/L (ref 21–32)
CREAT SERPL-MCNC: 1.25 MG/DL (ref 0.6–1.3)
EOSINOPHIL # BLD AUTO: 0 THOUSAND/ΜL (ref 0–0.61)
EOSINOPHIL NFR BLD AUTO: 0 % (ref 0–6)
ERYTHROCYTE [DISTWIDTH] IN BLOOD BY AUTOMATED COUNT: 13.2 % (ref 11.6–15.1)
EST. AVERAGE GLUCOSE BLD GHB EST-MCNC: 100 MG/DL
GFR SERPL CREATININE-BSD FRML MDRD: 52 ML/MIN/1.73SQ M
GLUCOSE SERPL-MCNC: 107 MG/DL (ref 65–140)
GLUCOSE SERPL-MCNC: 121 MG/DL (ref 65–140)
GLUCOSE SERPL-MCNC: 146 MG/DL (ref 65–140)
GLUCOSE SERPL-MCNC: 174 MG/DL (ref 65–140)
GLUCOSE SERPL-MCNC: 210 MG/DL (ref 65–140)
GRAM STN SPEC: ABNORMAL
GRAM STN SPEC: ABNORMAL
HBA1C MFR BLD: 5.1 % (ref 4.2–6.3)
HCT VFR BLD AUTO: 36.7 % (ref 36.5–49.3)
HGB BLD-MCNC: 11.8 G/DL (ref 12–17)
IMM GRANULOCYTES # BLD AUTO: 0.23 THOUSAND/UL (ref 0–0.2)
IMM GRANULOCYTES NFR BLD AUTO: 1 % (ref 0–2)
LYMPHOCYTES # BLD AUTO: 0.54 THOUSANDS/ΜL (ref 0.6–4.47)
LYMPHOCYTES NFR BLD AUTO: 3 % (ref 14–44)
MCH RBC QN AUTO: 33.7 PG (ref 26.8–34.3)
MCHC RBC AUTO-ENTMCNC: 32.2 G/DL (ref 31.4–37.4)
MCV RBC AUTO: 105 FL (ref 82–98)
MONOCYTES # BLD AUTO: 0.62 THOUSAND/ΜL (ref 0.17–1.22)
MONOCYTES NFR BLD AUTO: 4 % (ref 4–12)
MRSA NOSE QL CULT: NORMAL
NEUTROPHILS # BLD AUTO: 15.36 THOUSANDS/ΜL (ref 1.85–7.62)
NEUTS SEG NFR BLD AUTO: 92 % (ref 43–75)
NRBC BLD AUTO-RTO: 0 /100 WBCS
PLATELET # BLD AUTO: 144 THOUSANDS/UL (ref 149–390)
PMV BLD AUTO: 10.5 FL (ref 8.9–12.7)
POTASSIUM SERPL-SCNC: 4.2 MMOL/L (ref 3.5–5.3)
RBC # BLD AUTO: 3.5 MILLION/UL (ref 3.88–5.62)
SODIUM SERPL-SCNC: 136 MMOL/L (ref 136–145)
TROPONIN I SERPL-MCNC: 0.03 NG/ML
TROPONIN I SERPL-MCNC: 0.18 NG/ML
TROPONIN I SERPL-MCNC: 0.2 NG/ML
TROPONIN I SERPL-MCNC: 0.21 NG/ML
WBC # BLD AUTO: 16.76 THOUSAND/UL (ref 4.31–10.16)

## 2019-03-27 PROCEDURE — 84484 ASSAY OF TROPONIN QUANT: CPT | Performed by: INTERNAL MEDICINE

## 2019-03-27 PROCEDURE — 99231 SBSQ HOSP IP/OBS SF/LOW 25: CPT | Performed by: INTERNAL MEDICINE

## 2019-03-27 PROCEDURE — 83036 HEMOGLOBIN GLYCOSYLATED A1C: CPT | Performed by: INTERNAL MEDICINE

## 2019-03-27 PROCEDURE — 94668 MNPJ CHEST WALL SBSQ: CPT

## 2019-03-27 PROCEDURE — 97110 THERAPEUTIC EXERCISES: CPT

## 2019-03-27 PROCEDURE — 80061 LIPID PANEL: CPT | Performed by: NURSE PRACTITIONER

## 2019-03-27 PROCEDURE — 99232 SBSQ HOSP IP/OBS MODERATE 35: CPT | Performed by: INTERNAL MEDICINE

## 2019-03-27 PROCEDURE — 94640 AIRWAY INHALATION TREATMENT: CPT

## 2019-03-27 PROCEDURE — 80048 BASIC METABOLIC PNL TOTAL CA: CPT | Performed by: INTERNAL MEDICINE

## 2019-03-27 PROCEDURE — 93005 ELECTROCARDIOGRAM TRACING: CPT

## 2019-03-27 PROCEDURE — 85025 COMPLETE CBC W/AUTO DIFF WBC: CPT | Performed by: INTERNAL MEDICINE

## 2019-03-27 PROCEDURE — 94760 N-INVAS EAR/PLS OXIMETRY 1: CPT

## 2019-03-27 PROCEDURE — 82948 REAGENT STRIP/BLOOD GLUCOSE: CPT

## 2019-03-27 PROCEDURE — 97530 THERAPEUTIC ACTIVITIES: CPT

## 2019-03-27 RX ORDER — AZITHROMYCIN 250 MG/1
500 TABLET, FILM COATED ORAL DAILY
Status: COMPLETED | OUTPATIENT
Start: 2019-03-27 | End: 2019-03-29

## 2019-03-27 RX ORDER — FAMOTIDINE 20 MG/1
20 TABLET, FILM COATED ORAL 2 TIMES DAILY
Status: DISCONTINUED | OUTPATIENT
Start: 2019-03-27 | End: 2019-03-29 | Stop reason: HOSPADM

## 2019-03-27 RX ORDER — CLOTRIMAZOLE 10 MG/1
10 LOZENGE ORAL; TOPICAL
Status: DISCONTINUED | OUTPATIENT
Start: 2019-03-27 | End: 2019-03-29 | Stop reason: HOSPADM

## 2019-03-27 RX ADMIN — CLOTRIMAZOLE 10 MG: 10 LOZENGE ORAL at 11:36

## 2019-03-27 RX ADMIN — HEPARIN SODIUM 5000 UNITS: 5000 INJECTION, SOLUTION INTRAVENOUS; SUBCUTANEOUS at 22:10

## 2019-03-27 RX ADMIN — INSULIN LISPRO 1 UNITS: 100 INJECTION, SOLUTION INTRAVENOUS; SUBCUTANEOUS at 17:12

## 2019-03-27 RX ADMIN — ASPIRIN 81 MG 81 MG: 81 TABLET ORAL at 09:59

## 2019-03-27 RX ADMIN — IPRATROPIUM BROMIDE AND ALBUTEROL SULFATE 3 ML: 2.5; .5 SOLUTION RESPIRATORY (INHALATION) at 04:19

## 2019-03-27 RX ADMIN — LEVOTHYROXINE SODIUM 175 MCG: 150 TABLET ORAL at 06:19

## 2019-03-27 RX ADMIN — METHYLPREDNISOLONE SODIUM SUCCINATE 40 MG: 40 INJECTION, POWDER, FOR SOLUTION INTRAMUSCULAR; INTRAVENOUS at 22:05

## 2019-03-27 RX ADMIN — IPRATROPIUM BROMIDE AND ALBUTEROL SULFATE 3 ML: 2.5; .5 SOLUTION RESPIRATORY (INHALATION) at 19:43

## 2019-03-27 RX ADMIN — METHYLPREDNISOLONE SODIUM SUCCINATE 40 MG: 40 INJECTION, POWDER, FOR SOLUTION INTRAMUSCULAR; INTRAVENOUS at 10:03

## 2019-03-27 RX ADMIN — CLOPIDOGREL BISULFATE 75 MG: 75 TABLET ORAL at 10:00

## 2019-03-27 RX ADMIN — IPRATROPIUM BROMIDE AND ALBUTEROL SULFATE 3 ML: 2.5; .5 SOLUTION RESPIRATORY (INHALATION) at 07:23

## 2019-03-27 RX ADMIN — ATORVASTATIN CALCIUM 40 MG: 40 TABLET, FILM COATED ORAL at 17:07

## 2019-03-27 RX ADMIN — IPRATROPIUM BROMIDE AND ALBUTEROL SULFATE 3 ML: 2.5; .5 SOLUTION RESPIRATORY (INHALATION) at 15:49

## 2019-03-27 RX ADMIN — OXYCODONE HYDROCHLORIDE AND ACETAMINOPHEN 1000 MG: 500 TABLET ORAL at 09:59

## 2019-03-27 RX ADMIN — METOPROLOL TARTRATE 50 MG: 50 TABLET, FILM COATED ORAL at 22:13

## 2019-03-27 RX ADMIN — CLOTRIMAZOLE 10 MG: 10 LOZENGE ORAL at 22:09

## 2019-03-27 RX ADMIN — CLOTRIMAZOLE 10 MG: 10 LOZENGE ORAL at 17:06

## 2019-03-27 RX ADMIN — BENZONATATE 200 MG: 100 CAPSULE ORAL at 22:08

## 2019-03-27 RX ADMIN — AMLODIPINE BESYLATE 5 MG: 5 TABLET ORAL at 17:06

## 2019-03-27 RX ADMIN — NICOTINE 1 PATCH: 14 PATCH TRANSDERMAL at 10:03

## 2019-03-27 RX ADMIN — BIMATOPROST 1 DROP: 0.1 SOLUTION/ DROPS OPHTHALMIC at 22:09

## 2019-03-27 RX ADMIN — FAMOTIDINE 20 MG: 20 TABLET, FILM COATED ORAL at 11:36

## 2019-03-27 RX ADMIN — HEPARIN SODIUM 5000 UNITS: 5000 INJECTION, SOLUTION INTRAVENOUS; SUBCUTANEOUS at 14:11

## 2019-03-27 RX ADMIN — AZITHROMYCIN 500 MG: 250 TABLET, FILM COATED ORAL at 11:35

## 2019-03-27 RX ADMIN — LOSARTAN POTASSIUM 50 MG: 50 TABLET, FILM COATED ORAL at 10:00

## 2019-03-27 RX ADMIN — METOPROLOL TARTRATE 50 MG: 50 TABLET, FILM COATED ORAL at 09:59

## 2019-03-27 RX ADMIN — IPRATROPIUM BROMIDE AND ALBUTEROL SULFATE 3 ML: 2.5; .5 SOLUTION RESPIRATORY (INHALATION) at 23:25

## 2019-03-27 RX ADMIN — BENZONATATE 200 MG: 100 CAPSULE ORAL at 17:06

## 2019-03-27 RX ADMIN — HEPARIN SODIUM 5000 UNITS: 5000 INJECTION, SOLUTION INTRAVENOUS; SUBCUTANEOUS at 06:19

## 2019-03-27 RX ADMIN — FAMOTIDINE 20 MG: 20 TABLET, FILM COATED ORAL at 17:07

## 2019-03-27 RX ADMIN — IPRATROPIUM BROMIDE AND ALBUTEROL SULFATE 3 ML: 2.5; .5 SOLUTION RESPIRATORY (INHALATION) at 11:16

## 2019-03-27 RX ADMIN — BENZONATATE 200 MG: 100 CAPSULE ORAL at 09:59

## 2019-03-27 RX ADMIN — CLOTRIMAZOLE 10 MG: 10 LOZENGE ORAL at 14:11

## 2019-03-27 RX ADMIN — SERTRALINE HYDROCHLORIDE 50 MG: 50 TABLET ORAL at 10:00

## 2019-03-28 ENCOUNTER — APPOINTMENT (INPATIENT)
Dept: NON INVASIVE DIAGNOSTICS | Facility: HOSPITAL | Age: 84
DRG: 192 | End: 2019-03-28
Payer: COMMERCIAL

## 2019-03-28 PROBLEM — Z98.890 HISTORY OF THORACOTOMY: Status: ACTIVE | Noted: 2019-03-28

## 2019-03-28 LAB
ANION GAP SERPL CALCULATED.3IONS-SCNC: 10 MMOL/L (ref 4–13)
BASOPHILS # BLD AUTO: 0.01 THOUSANDS/ΜL (ref 0–0.1)
BASOPHILS NFR BLD AUTO: 0 % (ref 0–1)
BUN SERPL-MCNC: 43 MG/DL (ref 5–25)
CALCIUM SERPL-MCNC: 9.2 MG/DL (ref 8.3–10.1)
CHLORIDE SERPL-SCNC: 103 MMOL/L (ref 100–108)
CHOLEST SERPL-MCNC: 175 MG/DL (ref 50–200)
CO2 SERPL-SCNC: 19 MMOL/L (ref 21–32)
CREAT SERPL-MCNC: 1.09 MG/DL (ref 0.6–1.3)
EOSINOPHIL # BLD AUTO: 0 THOUSAND/ΜL (ref 0–0.61)
EOSINOPHIL NFR BLD AUTO: 0 % (ref 0–6)
ERYTHROCYTE [DISTWIDTH] IN BLOOD BY AUTOMATED COUNT: 13.3 % (ref 11.6–15.1)
GFR SERPL CREATININE-BSD FRML MDRD: 62 ML/MIN/1.73SQ M
GLUCOSE SERPL-MCNC: 114 MG/DL (ref 65–140)
GLUCOSE SERPL-MCNC: 123 MG/DL (ref 65–140)
GLUCOSE SERPL-MCNC: 129 MG/DL (ref 65–140)
GLUCOSE SERPL-MCNC: 129 MG/DL (ref 65–140)
GLUCOSE SERPL-MCNC: 209 MG/DL (ref 65–140)
HCT VFR BLD AUTO: 37.4 % (ref 36.5–49.3)
HDLC SERPL-MCNC: 61 MG/DL (ref 40–60)
HGB BLD-MCNC: 12 G/DL (ref 12–17)
IMM GRANULOCYTES # BLD AUTO: 0.17 THOUSAND/UL (ref 0–0.2)
IMM GRANULOCYTES NFR BLD AUTO: 1 % (ref 0–2)
LDLC SERPL CALC-MCNC: 97 MG/DL (ref 0–100)
LYMPHOCYTES # BLD AUTO: 0.39 THOUSANDS/ΜL (ref 0.6–4.47)
LYMPHOCYTES NFR BLD AUTO: 3 % (ref 14–44)
MCH RBC QN AUTO: 33.1 PG (ref 26.8–34.3)
MCHC RBC AUTO-ENTMCNC: 32.1 G/DL (ref 31.4–37.4)
MCV RBC AUTO: 103 FL (ref 82–98)
MONOCYTES # BLD AUTO: 0.69 THOUSAND/ΜL (ref 0.17–1.22)
MONOCYTES NFR BLD AUTO: 5 % (ref 4–12)
NEUTROPHILS # BLD AUTO: 13.16 THOUSANDS/ΜL (ref 1.85–7.62)
NEUTS SEG NFR BLD AUTO: 91 % (ref 43–75)
NONHDLC SERPL-MCNC: 114 MG/DL
NRBC BLD AUTO-RTO: 0 /100 WBCS
PLATELET # BLD AUTO: 158 THOUSANDS/UL (ref 149–390)
PMV BLD AUTO: 10.5 FL (ref 8.9–12.7)
POTASSIUM SERPL-SCNC: 4.8 MMOL/L (ref 3.5–5.3)
RBC # BLD AUTO: 3.62 MILLION/UL (ref 3.88–5.62)
SODIUM SERPL-SCNC: 132 MMOL/L (ref 136–145)
TRIGL SERPL-MCNC: 84 MG/DL
TROPONIN I SERPL-MCNC: 0.13 NG/ML
TROPONIN I SERPL-MCNC: 0.18 NG/ML
TROPONIN I SERPL-MCNC: 0.19 NG/ML
WBC # BLD AUTO: 14.42 THOUSAND/UL (ref 4.31–10.16)

## 2019-03-28 PROCEDURE — 85025 COMPLETE CBC W/AUTO DIFF WBC: CPT | Performed by: INTERNAL MEDICINE

## 2019-03-28 PROCEDURE — 93306 TTE W/DOPPLER COMPLETE: CPT | Performed by: INTERNAL MEDICINE

## 2019-03-28 PROCEDURE — 82948 REAGENT STRIP/BLOOD GLUCOSE: CPT

## 2019-03-28 PROCEDURE — 93306 TTE W/DOPPLER COMPLETE: CPT

## 2019-03-28 PROCEDURE — 99232 SBSQ HOSP IP/OBS MODERATE 35: CPT | Performed by: INTERNAL MEDICINE

## 2019-03-28 PROCEDURE — 94640 AIRWAY INHALATION TREATMENT: CPT

## 2019-03-28 PROCEDURE — 94760 N-INVAS EAR/PLS OXIMETRY 1: CPT

## 2019-03-28 PROCEDURE — 84484 ASSAY OF TROPONIN QUANT: CPT | Performed by: INTERNAL MEDICINE

## 2019-03-28 PROCEDURE — 80048 BASIC METABOLIC PNL TOTAL CA: CPT | Performed by: INTERNAL MEDICINE

## 2019-03-28 PROCEDURE — 99222 1ST HOSP IP/OBS MODERATE 55: CPT | Performed by: INTERNAL MEDICINE

## 2019-03-28 PROCEDURE — 94761 N-INVAS EAR/PLS OXIMETRY MLT: CPT

## 2019-03-28 RX ORDER — ATORVASTATIN CALCIUM 80 MG/1
80 TABLET, FILM COATED ORAL
Status: DISCONTINUED | OUTPATIENT
Start: 2019-03-28 | End: 2019-03-29 | Stop reason: HOSPADM

## 2019-03-28 RX ORDER — FLUTICASONE FUROATE AND VILANTEROL 100; 25 UG/1; UG/1
1 POWDER RESPIRATORY (INHALATION) DAILY
Status: DISCONTINUED | OUTPATIENT
Start: 2019-03-28 | End: 2019-03-29 | Stop reason: HOSPADM

## 2019-03-28 RX ADMIN — FLUTICASONE FUROATE AND VILANTEROL TRIFENATATE 1 PUFF: 100; 25 POWDER RESPIRATORY (INHALATION) at 10:51

## 2019-03-28 RX ADMIN — METOPROLOL TARTRATE 50 MG: 50 TABLET, FILM COATED ORAL at 22:02

## 2019-03-28 RX ADMIN — LEVOTHYROXINE SODIUM 175 MCG: 150 TABLET ORAL at 06:02

## 2019-03-28 RX ADMIN — HEPARIN SODIUM 5000 UNITS: 5000 INJECTION, SOLUTION INTRAVENOUS; SUBCUTANEOUS at 06:02

## 2019-03-28 RX ADMIN — NICOTINE 1 PATCH: 14 PATCH TRANSDERMAL at 10:15

## 2019-03-28 RX ADMIN — METOPROLOL TARTRATE 50 MG: 50 TABLET, FILM COATED ORAL at 10:13

## 2019-03-28 RX ADMIN — AZITHROMYCIN 500 MG: 250 TABLET, FILM COATED ORAL at 10:13

## 2019-03-28 RX ADMIN — CLOTRIMAZOLE 10 MG: 10 LOZENGE ORAL at 22:02

## 2019-03-28 RX ADMIN — CLOTRIMAZOLE 10 MG: 10 LOZENGE ORAL at 06:02

## 2019-03-28 RX ADMIN — BIMATOPROST 1 DROP: 0.1 SOLUTION/ DROPS OPHTHALMIC at 22:02

## 2019-03-28 RX ADMIN — HEPARIN SODIUM 5000 UNITS: 5000 INJECTION, SOLUTION INTRAVENOUS; SUBCUTANEOUS at 15:20

## 2019-03-28 RX ADMIN — LOSARTAN POTASSIUM 50 MG: 50 TABLET, FILM COATED ORAL at 10:13

## 2019-03-28 RX ADMIN — METHYLPREDNISOLONE SODIUM SUCCINATE 40 MG: 40 INJECTION, POWDER, FOR SOLUTION INTRAMUSCULAR; INTRAVENOUS at 22:02

## 2019-03-28 RX ADMIN — METHYLPREDNISOLONE SODIUM SUCCINATE 40 MG: 40 INJECTION, POWDER, FOR SOLUTION INTRAMUSCULAR; INTRAVENOUS at 10:14

## 2019-03-28 RX ADMIN — CLOPIDOGREL BISULFATE 75 MG: 75 TABLET ORAL at 10:14

## 2019-03-28 RX ADMIN — IPRATROPIUM BROMIDE AND ALBUTEROL SULFATE 3 ML: 2.5; .5 SOLUTION RESPIRATORY (INHALATION) at 04:21

## 2019-03-28 RX ADMIN — CLOTRIMAZOLE 10 MG: 10 LOZENGE ORAL at 14:51

## 2019-03-28 RX ADMIN — OXYCODONE HYDROCHLORIDE AND ACETAMINOPHEN 1000 MG: 500 TABLET ORAL at 10:11

## 2019-03-28 RX ADMIN — INSULIN LISPRO 1 UNITS: 100 INJECTION, SOLUTION INTRAVENOUS; SUBCUTANEOUS at 12:15

## 2019-03-28 RX ADMIN — IPRATROPIUM BROMIDE AND ALBUTEROL SULFATE 3 ML: 2.5; .5 SOLUTION RESPIRATORY (INHALATION) at 19:43

## 2019-03-28 RX ADMIN — BENZONATATE 200 MG: 100 CAPSULE ORAL at 10:12

## 2019-03-28 RX ADMIN — IPRATROPIUM BROMIDE AND ALBUTEROL SULFATE 3 ML: 2.5; .5 SOLUTION RESPIRATORY (INHALATION) at 15:05

## 2019-03-28 RX ADMIN — AMLODIPINE BESYLATE 5 MG: 5 TABLET ORAL at 18:05

## 2019-03-28 RX ADMIN — FAMOTIDINE 20 MG: 20 TABLET, FILM COATED ORAL at 18:05

## 2019-03-28 RX ADMIN — CLOTRIMAZOLE 10 MG: 10 LOZENGE ORAL at 10:51

## 2019-03-28 RX ADMIN — BENZONATATE 200 MG: 100 CAPSULE ORAL at 15:20

## 2019-03-28 RX ADMIN — BENZONATATE 200 MG: 100 CAPSULE ORAL at 22:01

## 2019-03-28 RX ADMIN — ASPIRIN 81 MG 81 MG: 81 TABLET ORAL at 10:12

## 2019-03-28 RX ADMIN — IPRATROPIUM BROMIDE AND ALBUTEROL SULFATE 3 ML: 2.5; .5 SOLUTION RESPIRATORY (INHALATION) at 11:52

## 2019-03-28 RX ADMIN — HEPARIN SODIUM 5000 UNITS: 5000 INJECTION, SOLUTION INTRAVENOUS; SUBCUTANEOUS at 22:03

## 2019-03-28 RX ADMIN — CLOTRIMAZOLE 10 MG: 10 LOZENGE ORAL at 18:07

## 2019-03-28 RX ADMIN — ATORVASTATIN CALCIUM 80 MG: 80 TABLET, FILM COATED ORAL at 16:50

## 2019-03-28 RX ADMIN — SERTRALINE HYDROCHLORIDE 50 MG: 50 TABLET ORAL at 10:13

## 2019-03-28 RX ADMIN — FAMOTIDINE 20 MG: 20 TABLET, FILM COATED ORAL at 10:12

## 2019-03-29 VITALS
SYSTOLIC BLOOD PRESSURE: 141 MMHG | RESPIRATION RATE: 18 BRPM | OXYGEN SATURATION: 94 % | HEIGHT: 69 IN | TEMPERATURE: 97.7 F | WEIGHT: 156.53 LBS | BODY MASS INDEX: 23.18 KG/M2 | DIASTOLIC BLOOD PRESSURE: 71 MMHG | HEART RATE: 69 BPM

## 2019-03-29 LAB
ATRIAL RATE: 78 BPM
ATRIAL RATE: 99 BPM
GLUCOSE SERPL-MCNC: 117 MG/DL (ref 65–140)
GLUCOSE SERPL-MCNC: 165 MG/DL (ref 65–140)
GLUCOSE SERPL-MCNC: 64 MG/DL (ref 65–140)
P AXIS: 53 DEGREES
P AXIS: 72 DEGREES
PR INTERVAL: 202 MS
PR INTERVAL: 202 MS
QRS AXIS: -33 DEGREES
QRS AXIS: 0 DEGREES
QRSD INTERVAL: 84 MS
QRSD INTERVAL: 88 MS
QT INTERVAL: 342 MS
QT INTERVAL: 362 MS
QTC INTERVAL: 412 MS
QTC INTERVAL: 438 MS
T WAVE AXIS: 68 DEGREES
T WAVE AXIS: 78 DEGREES
VENTRICULAR RATE: 78 BPM
VENTRICULAR RATE: 99 BPM

## 2019-03-29 PROCEDURE — 97530 THERAPEUTIC ACTIVITIES: CPT

## 2019-03-29 PROCEDURE — 99239 HOSP IP/OBS DSCHRG MGMT >30: CPT | Performed by: INTERNAL MEDICINE

## 2019-03-29 PROCEDURE — 94760 N-INVAS EAR/PLS OXIMETRY 1: CPT

## 2019-03-29 PROCEDURE — 97535 SELF CARE MNGMENT TRAINING: CPT

## 2019-03-29 PROCEDURE — 93010 ELECTROCARDIOGRAM REPORT: CPT | Performed by: INTERNAL MEDICINE

## 2019-03-29 PROCEDURE — 94640 AIRWAY INHALATION TREATMENT: CPT

## 2019-03-29 PROCEDURE — 82948 REAGENT STRIP/BLOOD GLUCOSE: CPT

## 2019-03-29 PROCEDURE — 99232 SBSQ HOSP IP/OBS MODERATE 35: CPT | Performed by: INTERNAL MEDICINE

## 2019-03-29 RX ORDER — NICOTINE 21 MG/24HR
1 PATCH, TRANSDERMAL 24 HOURS TRANSDERMAL DAILY
Qty: 28 PATCH | Refills: 0 | Status: SHIPPED | OUTPATIENT
Start: 2019-03-30

## 2019-03-29 RX ORDER — BENZONATATE 100 MG/1
100 CAPSULE ORAL 3 TIMES DAILY
Qty: 21 CAPSULE | Refills: 0 | Status: SHIPPED | OUTPATIENT
Start: 2019-03-29 | End: 2019-04-05

## 2019-03-29 RX ORDER — ALBUTEROL SULFATE 2.5 MG/3ML
2.5 SOLUTION RESPIRATORY (INHALATION) 4 TIMES DAILY
Qty: 360 ML | Refills: 0 | Status: SHIPPED | OUTPATIENT
Start: 2019-03-29 | End: 2019-04-28

## 2019-03-29 RX ORDER — PREDNISONE 10 MG/1
TABLET ORAL
Qty: 30 TABLET | Refills: 0 | Status: SHIPPED | OUTPATIENT
Start: 2019-03-29

## 2019-03-29 RX ORDER — CLOTRIMAZOLE 10 MG/1
10 LOZENGE ORAL; TOPICAL
Qty: 60 TABLET | Refills: 0 | Status: SHIPPED | OUTPATIENT
Start: 2019-03-29 | End: 2019-04-10

## 2019-03-29 RX ORDER — FLUTICASONE FUROATE AND VILANTEROL 100; 25 UG/1; UG/1
1 POWDER RESPIRATORY (INHALATION) DAILY
Qty: 1 INHALER | Refills: 0 | Status: SHIPPED | OUTPATIENT
Start: 2019-03-30 | End: 2019-04-29

## 2019-03-29 RX ORDER — FAMOTIDINE 20 MG/1
20 TABLET, FILM COATED ORAL 2 TIMES DAILY
Qty: 60 TABLET | Refills: 0 | Status: SHIPPED | OUTPATIENT
Start: 2019-03-29 | End: 2019-04-28

## 2019-03-29 RX ADMIN — IPRATROPIUM BROMIDE AND ALBUTEROL SULFATE 3 ML: 2.5; .5 SOLUTION RESPIRATORY (INHALATION) at 11:37

## 2019-03-29 RX ADMIN — LEVOTHYROXINE SODIUM 175 MCG: 150 TABLET ORAL at 06:43

## 2019-03-29 RX ADMIN — LOSARTAN POTASSIUM 50 MG: 50 TABLET, FILM COATED ORAL at 08:15

## 2019-03-29 RX ADMIN — BENZONATATE 200 MG: 100 CAPSULE ORAL at 08:12

## 2019-03-29 RX ADMIN — OXYCODONE HYDROCHLORIDE AND ACETAMINOPHEN 1000 MG: 500 TABLET ORAL at 08:14

## 2019-03-29 RX ADMIN — ASPIRIN 81 MG 81 MG: 81 TABLET ORAL at 08:16

## 2019-03-29 RX ADMIN — CLOPIDOGREL BISULFATE 75 MG: 75 TABLET ORAL at 08:14

## 2019-03-29 RX ADMIN — AZITHROMYCIN 500 MG: 250 TABLET, FILM COATED ORAL at 08:13

## 2019-03-29 RX ADMIN — FLUTICASONE FUROATE AND VILANTEROL TRIFENATATE 1 PUFF: 100; 25 POWDER RESPIRATORY (INHALATION) at 08:35

## 2019-03-29 RX ADMIN — HEPARIN SODIUM 5000 UNITS: 5000 INJECTION, SOLUTION INTRAVENOUS; SUBCUTANEOUS at 07:18

## 2019-03-29 RX ADMIN — CLOTRIMAZOLE 10 MG: 10 LOZENGE ORAL at 13:48

## 2019-03-29 RX ADMIN — METHYLPREDNISOLONE SODIUM SUCCINATE 40 MG: 40 INJECTION, POWDER, FOR SOLUTION INTRAMUSCULAR; INTRAVENOUS at 08:16

## 2019-03-29 RX ADMIN — CLOTRIMAZOLE 10 MG: 10 LOZENGE ORAL at 07:18

## 2019-03-29 RX ADMIN — METOPROLOL TARTRATE 50 MG: 50 TABLET, FILM COATED ORAL at 08:14

## 2019-03-29 RX ADMIN — IPRATROPIUM BROMIDE AND ALBUTEROL SULFATE 3 ML: 2.5; .5 SOLUTION RESPIRATORY (INHALATION) at 15:39

## 2019-03-29 RX ADMIN — FAMOTIDINE 20 MG: 20 TABLET, FILM COATED ORAL at 08:13

## 2019-03-29 RX ADMIN — IPRATROPIUM BROMIDE AND ALBUTEROL SULFATE 3 ML: 2.5; .5 SOLUTION RESPIRATORY (INHALATION) at 07:27

## 2019-03-29 RX ADMIN — NICOTINE 1 PATCH: 14 PATCH TRANSDERMAL at 08:16

## 2019-03-29 RX ADMIN — SERTRALINE HYDROCHLORIDE 50 MG: 50 TABLET ORAL at 08:15

## 2019-03-29 RX ADMIN — BENZONATATE 200 MG: 100 CAPSULE ORAL at 15:32

## 2019-04-01 ENCOUNTER — TRANSITIONAL CARE MANAGEMENT (OUTPATIENT)
Dept: FAMILY MEDICINE CLINIC | Facility: CLINIC | Age: 84
End: 2019-04-01

## 2019-04-04 ENCOUNTER — TELEPHONE (OUTPATIENT)
Dept: FAMILY MEDICINE CLINIC | Facility: CLINIC | Age: 84
End: 2019-04-04

## 2019-04-23 ENCOUNTER — OFFICE VISIT (OUTPATIENT)
Dept: PULMONOLOGY | Facility: MEDICAL CENTER | Age: 84
End: 2019-04-23
Payer: COMMERCIAL

## 2019-04-23 VITALS
HEIGHT: 69 IN | RESPIRATION RATE: 12 BRPM | DIASTOLIC BLOOD PRESSURE: 64 MMHG | HEART RATE: 98 BPM | SYSTOLIC BLOOD PRESSURE: 122 MMHG | BODY MASS INDEX: 24.59 KG/M2 | TEMPERATURE: 97.2 F | WEIGHT: 166 LBS | OXYGEN SATURATION: 96 %

## 2019-04-23 DIAGNOSIS — Z98.890 HISTORY OF THORACOTOMY: ICD-10-CM

## 2019-04-23 DIAGNOSIS — J43.2 CENTRILOBULAR EMPHYSEMA (HCC): ICD-10-CM

## 2019-04-23 DIAGNOSIS — R05.9 COUGH: Primary | ICD-10-CM

## 2019-04-23 DIAGNOSIS — Z72.0 NICOTINE ABUSE: ICD-10-CM

## 2019-04-23 PROCEDURE — 99214 OFFICE O/P EST MOD 30 MIN: CPT | Performed by: NURSE PRACTITIONER

## 2019-04-23 PROCEDURE — 94010 BREATHING CAPACITY TEST: CPT | Performed by: NURSE PRACTITIONER

## 2019-05-10 DIAGNOSIS — E03.9 ACQUIRED HYPOTHYROIDISM: ICD-10-CM

## 2019-05-11 RX ORDER — LEVOTHYROXINE SODIUM 175 UG/1
TABLET ORAL
Qty: 90 TABLET | Refills: 3 | Status: SHIPPED | OUTPATIENT
Start: 2019-05-11 | End: 2020-07-06 | Stop reason: SDUPTHER

## 2019-06-20 ENCOUNTER — OFFICE VISIT (OUTPATIENT)
Dept: FAMILY MEDICINE CLINIC | Facility: CLINIC | Age: 84
End: 2019-06-20
Payer: COMMERCIAL

## 2019-06-20 VITALS
RESPIRATION RATE: 18 BRPM | OXYGEN SATURATION: 95 % | BODY MASS INDEX: 23.46 KG/M2 | WEIGHT: 158.4 LBS | SYSTOLIC BLOOD PRESSURE: 100 MMHG | HEIGHT: 69 IN | DIASTOLIC BLOOD PRESSURE: 58 MMHG | HEART RATE: 84 BPM | TEMPERATURE: 97.9 F

## 2019-06-20 DIAGNOSIS — J44.1 CHRONIC OBSTRUCTIVE PULMONARY DISEASE WITH ACUTE EXACERBATION (HCC): Primary | ICD-10-CM

## 2019-06-20 DIAGNOSIS — I25.10 CORONARY ARTERY DISEASE INVOLVING NATIVE CORONARY ARTERY OF NATIVE HEART WITHOUT ANGINA PECTORIS: ICD-10-CM

## 2019-06-20 DIAGNOSIS — E03.9 ACQUIRED HYPOTHYROIDISM: ICD-10-CM

## 2019-06-20 DIAGNOSIS — C61 ADENOCARCINOMA OF PROSTATE (HCC): ICD-10-CM

## 2019-06-20 DIAGNOSIS — J43.2 CENTRILOBULAR EMPHYSEMA (HCC): ICD-10-CM

## 2019-06-20 DIAGNOSIS — G47.33 OBSTRUCTIVE SLEEP APNEA: ICD-10-CM

## 2019-06-20 PROCEDURE — 99214 OFFICE O/P EST MOD 30 MIN: CPT | Performed by: FAMILY MEDICINE

## 2019-06-20 RX ORDER — ALBUTEROL SULFATE 2.5 MG/3ML
2.5 SOLUTION RESPIRATORY (INHALATION) EVERY 4 HOURS PRN
COMMUNITY
End: 2019-06-20 | Stop reason: SDUPTHER

## 2019-06-20 RX ORDER — ALBUTEROL SULFATE 2.5 MG/3ML
2.5 SOLUTION RESPIRATORY (INHALATION) EVERY 4 HOURS PRN
Qty: 100 VIAL | Refills: 3 | Status: SHIPPED | OUTPATIENT
Start: 2019-06-20

## 2019-09-23 ENCOUNTER — OFFICE VISIT (OUTPATIENT)
Dept: FAMILY MEDICINE CLINIC | Facility: CLINIC | Age: 84
End: 2019-09-23
Payer: COMMERCIAL

## 2019-09-23 VITALS
HEIGHT: 69 IN | DIASTOLIC BLOOD PRESSURE: 64 MMHG | TEMPERATURE: 98.5 F | SYSTOLIC BLOOD PRESSURE: 120 MMHG | BODY MASS INDEX: 22.19 KG/M2 | HEART RATE: 63 BPM | WEIGHT: 149.8 LBS | RESPIRATION RATE: 16 BRPM | OXYGEN SATURATION: 96 %

## 2019-09-23 DIAGNOSIS — Z23 NEED FOR INFLUENZA VACCINATION: Primary | ICD-10-CM

## 2019-09-23 DIAGNOSIS — I73.9 PERIPHERAL VASCULAR DISEASE (HCC): ICD-10-CM

## 2019-09-23 PROCEDURE — 90662 IIV NO PRSV INCREASED AG IM: CPT | Performed by: FAMILY MEDICINE

## 2019-09-23 PROCEDURE — 1170F FXNL STATUS ASSESSED: CPT | Performed by: FAMILY MEDICINE

## 2019-09-23 PROCEDURE — 1125F AMNT PAIN NOTED PAIN PRSNT: CPT | Performed by: FAMILY MEDICINE

## 2019-09-23 PROCEDURE — G0008 ADMIN INFLUENZA VIRUS VAC: HCPCS | Performed by: FAMILY MEDICINE

## 2019-09-23 PROCEDURE — 1101F PT FALLS ASSESS-DOCD LE1/YR: CPT | Performed by: FAMILY MEDICINE

## 2019-09-23 PROCEDURE — G0439 PPPS, SUBSEQ VISIT: HCPCS | Performed by: FAMILY MEDICINE
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
## 2019-09-23 NOTE — PATIENT INSTRUCTIONS
Medicare Preventive Visit Patient Instructions  Thank you for completing your Welcome to Medicare Visit or Medicare Annual Wellness Visit today  Your next wellness visit will be due in one year (9/23/2020)  The screening/preventive services that you may require over the next 5-10 years are detailed below  Some tests may not apply to you based off risk factors and/or age  Screening tests ordered at today's visit but not completed yet may show as past due  Also, please note that scanned in results may not display below  Preventive Screenings:  Service Recommendations Previous Testing/Comments   Colorectal Cancer Screening  · Colonoscopy    · Fecal Occult Blood Test (FOBT)/Fecal Immunochemical Test (FIT)  · Fecal DNA/Cologuard Test  · Flexible Sigmoidoscopy Age: 54-65 years old   Colonoscopy: every 10 years (May be performed more frequently if at higher risk)  OR  FOBT/FIT: every 1 year  OR  Cologuard: every 3 years  OR  Sigmoidoscopy: every 5 years  Screening may be recommended earlier than age 48 if at higher risk for colorectal cancer  Also, an individualized decision between you and your healthcare provider will decide whether screening between the ages of 74-80 would be appropriate   Colonoscopy: Not on file  FOBT/FIT: Not on file  Cologuard: Not on file  Sigmoidoscopy: Not on file    Screening Not Indicated     Prostate Cancer Screening Individualized decision between patient and health care provider in men between ages of 53-78   Medicare will cover every 12 months beginning on the day after your 50th birthday PSA: <0 1 ng/mL     History Prostate Cancer  Screening Not Indicated     Hepatitis C Screening Once for adults born between 1945 and 1965  More frequently in patients at high risk for Hepatitis C Hep C Antibody: Not on file       Diabetes Screening 1-2 times per year if you're at risk for diabetes or have pre-diabetes Fasting glucose: No results in last 5 years   A1C: 5 1 %    Screening Current Cholesterol Screening Once every 5 years if you don't have a lipid disorder  May order more often based on risk factors  Lipid panel: 03/27/2019    Screening Not Indicated  History Lipid Disorder      Other Preventive Screenings Covered by Medicare:  1  Abdominal Aortic Aneurysm (AAA) Screening: covered once if your at risk  You're considered to be at risk if you have a family history of AAA or a male between the age of 73-68 who smoking at least 100 cigarettes in your lifetime  2  Lung Cancer Screening: covers low dose CT scan once per year if you meet all of the following conditions: (1) Age 50-69; (2) No signs or symptoms of lung cancer; (3) Current smoker or have quit smoking within the last 15 years; (4) You have a tobacco smoking history of at least 30 pack years (packs per day x number of years you smoked); (5) You get a written order from a healthcare provider  3  Glaucoma Screening: covered annually if you're considered high risk: (1) You have diabetes OR (2) Family history of glaucoma OR (3)  aged 48 and older OR (3)  American aged 72 and older  3  Osteoporosis Screening: covered every 2 years if you meet one of the following conditions: (1) Have a vertebral abnormality; (2) On glucocorticoid therapy for more than 3 months; (3) Have primary hyperparathyroidism; (4) On osteoporosis medications and need to assess response to drug therapy  5  HIV Screening: covered annually if you're between the age of 12-76  Also covered annually if you are younger than 13 and older than 72 with risk factors for HIV infection  For pregnant patients, it is covered up to 3 times per pregnancy      Immunizations:  Immunization Recommendations   Influenza Vaccine Annual influenza vaccination during flu season is recommended for all persons aged >= 6 months who do not have contraindications   Pneumococcal Vaccine (Prevnar and Pneumovax)  * Prevnar = PCV13  * Pneumovax = PPSV23 Adults 25-60 years old: 1-3 doses may be recommended based on certain risk factors  Adults 72 years old: Prevnar (PCV13) vaccine recommended followed by Pneumovax (PPSV23) vaccine  If already received PPSV23 since turning 65, then PCV13 recommended at least one year after PPSV23 dose  Hepatitis B Vaccine 3 dose series if at intermediate or high risk (ex: diabetes, end stage renal disease, liver disease)   Tetanus (Td) Vaccine - COST NOT COVERED BY MEDICARE PART B Following completion of primary series, a booster dose should be given every 10 years to maintain immunity against tetanus  Td may also be given as tetanus wound prophylaxis  Tdap Vaccine - COST NOT COVERED BY MEDICARE PART B Recommended at least once for all adults  For pregnant patients, recommended with each pregnancy  Shingles Vaccine (Shingrix) - COST NOT COVERED BY MEDICARE PART B  2 shot series recommended in those aged 48 and above     Health Maintenance Due:  There are no preventive care reminders to display for this patient  Immunizations Due:      Topic Date Due    DTaP,Tdap,and Td Vaccines (1 - Tdap) 09/08/2000    INFLUENZA VACCINE  07/01/2019     Advance Directives   What are advance directives? Advance directives are legal documents that state your wishes and plans for medical care  These plans are made ahead of time in case you lose your ability to make decisions for yourself  Advance directives can apply to any medical decision, such as the treatments you want, and if you want to donate organs  What are the types of advance directives? There are many types of advance directives, and each state has rules about how to use them  You may choose a combination of any of the following:  · Living will: This is a written record of the treatment you want  You can also choose which treatments you do not want, which to limit, and which to stop at a certain time  This includes surgery, medicine, IV fluid, and tube feedings     · Durable power of  for [facility]): This is a written record that states who you want to make healthcare choices for you when you are unable to make them for yourself  This person, called a proxy, is usually a family member or a friend  You may choose more than 1 proxy  · Do not resuscitate (DNR) order:  A DNR order is used in case your heart stops beating or you stop breathing  It is a request not to have certain forms of treatment, such as CPR  A DNR order may be included in other types of advance directives  · Medical directive: This covers the care that you want if you are in a coma, near death, or unable to make decisions for yourself  You can list the treatments you want for each condition  Treatment may include pain medicine, surgery, blood transfusions, dialysis, IV or tube feedings, and a ventilator (breathing machine)  · Values history: This document has questions about your views, beliefs, and how you feel and think about life  This information can help others choose the care that you would choose  Why are advance directives important? An advance directive helps you control your care  Although spoken wishes may be used, it is better to have your wishes written down  Spoken wishes can be misunderstood, or not followed  Treatments may be given even if you do not want them  An advance directive may make it easier for your family to make difficult choices about your care  Cigarette Smoking and Your Health   Risks to your health if you smoke:  Nicotine and other chemicals found in tobacco damage every cell in your body  Even if you are a light smoker, you have an increased risk for cancer, heart disease, and lung disease  If you are pregnant or have diabetes, smoking increases your risk for complications  Benefits to your health if you stop smoking:   · You decrease respiratory symptoms such as coughing, wheezing, and shortness of breath     · You reduce your risk for cancers of the lung, mouth, throat, kidney, bladder, pancreas, stomach, and cervix  If you already have cancer, you increase the benefits of chemotherapy  You also reduce your risk for cancer returning or a second cancer from developing  · You reduce your risk for heart disease, blood clots, heart attack, and stroke  · You reduce your risk for lung infections, and diseases such as pneumonia, asthma, chronic bronchitis, and emphysema  · Your circulation improves  More oxygen can be delivered to your body  If you have diabetes, you lower your risk for complications, such as kidney, artery, and eye diseases  You also lower your risk for nerve damage  Nerve damage can lead to amputations, poor vision, and blindness  · You improve your body's ability to heal and to fight infections  For more information and support to stop smoking:   · [employer]  gov  Phone: 7- 069 - 891-3308  Web Address: www [employer]  [facility] 2018 Information is for End User's use only and may not be sold, redistributed or otherwise used for commercial purposes   All illustrations and images included in CareNotes® are the copyrighted property of [employer]  or [address] [place] [address]

## 2019-09-26 LAB
ALBUMIN SERPL-MCNC: 4.2 G/DL (ref 3.5–4.7)
ALBUMIN/GLOB SERPL: 2 {RATIO} (ref 1.2–2.2)
ALP SERPL-CCNC: 53 IU/L (ref 39–117)
ALT SERPL-CCNC: 19 IU/L (ref 0–44)
AST SERPL-CCNC: 27 IU/L (ref 0–40)
BILIRUB SERPL-MCNC: 0.4 MG/DL (ref 0–1.2)
BUN SERPL-MCNC: 18 MG/DL (ref 8–27)
BUN/CREAT SERPL: 19 (ref 10–24)
CALCIUM SERPL-MCNC: 9.7 MG/DL (ref 8.6–10.2)
CHLORIDE SERPL-SCNC: 105 MMOL/L (ref 96–106)
CO2 SERPL-SCNC: 25 MMOL/L (ref 20–29)
CREAT SERPL-MCNC: 0.95 MG/DL (ref 0.76–1.27)
GLOBULIN SER-MCNC: 2.1 G/DL (ref 1.5–4.5)
GLUCOSE SERPL-MCNC: 106 MG/DL (ref 65–99)
POTASSIUM SERPL-SCNC: 4.8 MMOL/L (ref 3.5–5.2)
PROT SERPL-MCNC: 6.3 G/DL (ref 6–8.5)
SL AMB EGFR AFRICAN AMERICAN: 83 ML/MIN/1.73
SL AMB EGFR NON AFRICAN AMERICAN: 72 ML/MIN/1.73
SODIUM SERPL-SCNC: 143 MMOL/L (ref 134–144)
TSH SERPL DL<=0.005 MIU/L-ACNC: 0.34 UIU/ML (ref 0.45–4.5)

## 2019-11-04 DIAGNOSIS — E78.2 MIXED HYPERLIPIDEMIA: ICD-10-CM

## 2019-11-04 DIAGNOSIS — Q24.5 CORONARY ARTERY ABNORMALITY: ICD-10-CM

## 2019-11-04 RX ORDER — SIMVASTATIN 80 MG
40 TABLET ORAL
Qty: 45 TABLET | Refills: 3 | Status: SHIPPED | OUTPATIENT
Start: 2019-11-04 | End: 2021-01-01

## 2019-11-04 RX ORDER — CLOPIDOGREL BISULFATE 75 MG/1
TABLET ORAL
Qty: 90 TABLET | Refills: 3 | Status: SHIPPED | OUTPATIENT
Start: 2019-11-04 | End: 2021-01-01

## 2019-11-08 ENCOUNTER — TELEPHONE (OUTPATIENT)
Dept: OTHER | Facility: OTHER | Age: 84
End: 2019-11-08

## 2019-11-08 NOTE — TELEPHONE ENCOUNTER
Medical supply Mariela mota called in for a confirmation that Dr Wendy Stubbs is the assigned doctor for the PT  PT's ID [id number]  Mariela mota would like a call back

## 2019-11-11 NOTE — TELEPHONE ENCOUNTER
Yes double check  Not sure what he is getting   If Pulmonary equipt etc that should come from [facility]

## 2019-11-11 NOTE — TELEPHONE ENCOUNTER
Jacqui Encarnacion states he has a urostomy  He has been changing it and taking care of it himself  He says that Jose Antonio Luna was the one who did the original surgery, but he's gone and then he was seeing another urologist, but he lost contact with him as well so now he just needs someone to sign the orders, but he takes care of everything  Please advise    Abeba Mai MA

## 2019-11-11 NOTE — TELEPHONE ENCOUNTER
Please advise  Should I call patient first and see if he is trying to get supplies through this company? Or do you want me to call the company and see why they want to know if you are the assigned doctor?     Charlotte Nava MA

## 2020-02-28 ENCOUNTER — TELEPHONE (OUTPATIENT)
Dept: FAMILY MEDICINE CLINIC | Facility: CLINIC | Age: 85
End: 2020-02-28

## 2020-02-28 NOTE — TELEPHONE ENCOUNTER
Dr Damien Vergara asked us to call patient again since he has not shown at the ED and no showed his appointment  She stated if we do not reach him to call next of kin  Grandson listed as an emergency contact    Aggie is calling grandson

## 2020-02-28 NOTE — TELEPHONE ENCOUNTER
Patient stated that he has SOB  While speaking to him he was coughing and he sounded like he had severe congestion  Patient denied dizziness, headaches, and chestpain  I stated to him the he might need to go to the ER to get evaluated and I told him to hold so that I can confirm with Dr Puja Carter  Spoke with Dr Puja Carter and informed her of the patient symptoms and she stated that he will need to go to the ER  Spoke to patient and informed him to go to the ER patient agreed and I stated that we will keep an eye out to follow up if he went to the ER  He agreed to go to ER  Acosta Goodrich MA  Sending to Dr Puja Carter  Please send back to clinical staff to make sure he went to ER
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
## 2020-02-29 NOTE — TELEPHONE ENCOUNTER
Tried calling patient and there was no answer also tried calling grandson and there was no answer could not leave ELDER Chadwick MA

## 2020-03-02 ENCOUNTER — TELEPHONE (OUTPATIENT)
Dept: FAMILY MEDICINE CLINIC | Facility: CLINIC | Age: 85
End: 2020-03-02

## 2020-03-02 NOTE — TELEPHONE ENCOUNTER
Spoke with pt about 2/28 no show  He was feeling better and stated he only has a runny nose   If it gets worse he will mina an appt

## 2020-07-06 DIAGNOSIS — E03.9 ACQUIRED HYPOTHYROIDISM: ICD-10-CM

## 2020-07-06 RX ORDER — LEVOTHYROXINE SODIUM 175 UG/1
TABLET ORAL
Qty: 90 TABLET | Refills: 0 | Status: SHIPPED | OUTPATIENT
Start: 2020-07-06 | End: 2020-11-23 | Stop reason: SDUPTHER

## 2020-07-14 DIAGNOSIS — J43.2 CENTRILOBULAR EMPHYSEMA (HCC): ICD-10-CM

## 2020-07-14 RX ORDER — TIOTROPIUM BROMIDE 18 UG/1
CAPSULE ORAL; RESPIRATORY (INHALATION)
Qty: 90 CAPSULE | Refills: 3 | Status: SHIPPED | OUTPATIENT
Start: 2020-07-14

## 2020-09-09 ENCOUNTER — TELEMEDICINE (OUTPATIENT)
Dept: FAMILY MEDICINE CLINIC | Facility: CLINIC | Age: 85
End: 2020-09-09
Payer: COMMERCIAL

## 2020-09-09 DIAGNOSIS — J02.9 SORE THROAT: Primary | ICD-10-CM

## 2020-09-09 PROCEDURE — 99213 OFFICE O/P EST LOW 20 MIN: CPT | Performed by: FAMILY MEDICINE
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
## 2020-10-29 ENCOUNTER — TELEPHONE (OUTPATIENT)
Dept: FAMILY MEDICINE CLINIC | Facility: CLINIC | Age: 85
End: 2020-10-29

## 2020-11-22 DIAGNOSIS — E03.9 ACQUIRED HYPOTHYROIDISM: ICD-10-CM

## 2020-11-23 RX ORDER — LEVOTHYROXINE SODIUM 175 UG/1
TABLET ORAL
Qty: 90 TABLET | Refills: 0 | Status: SHIPPED | OUTPATIENT
Start: 2020-11-23 | End: 2021-01-01 | Stop reason: SDUPTHER

## 2021-01-01 ENCOUNTER — TELEPHONE (OUTPATIENT)
Dept: FAMILY MEDICINE CLINIC | Facility: CLINIC | Age: 86
End: 2021-01-01

## 2021-01-01 DIAGNOSIS — F41.8 ANXIETY WITH DEPRESSION: ICD-10-CM

## 2021-01-01 DIAGNOSIS — Q24.5 CORONARY ARTERY ABNORMALITY: ICD-10-CM

## 2021-01-01 DIAGNOSIS — E78.2 MIXED HYPERLIPIDEMIA: ICD-10-CM

## 2021-01-01 DIAGNOSIS — E03.9 ACQUIRED HYPOTHYROIDISM: ICD-10-CM

## 2021-01-01 RX ORDER — CLOPIDOGREL BISULFATE 75 MG/1
TABLET ORAL
Qty: 90 TABLET | Refills: 3 | Status: SHIPPED | OUTPATIENT
Start: 2021-01-01

## 2021-01-01 RX ORDER — LEVOTHYROXINE SODIUM 175 UG/1
TABLET ORAL
Qty: 90 TABLET | Refills: 0 | Status: SHIPPED | OUTPATIENT
Start: 2021-01-01 | End: 2021-01-01

## 2021-01-01 RX ORDER — SIMVASTATIN 80 MG
TABLET ORAL
Qty: 45 TABLET | Refills: 3 | Status: SHIPPED | OUTPATIENT
Start: 2021-01-01

## 2021-03-26 NOTE — TELEPHONE ENCOUNTER
Ronda Ch answered phone and said she will call back once they decide on a date to come in  [facility]

## 2021-05-18 NOTE — TELEPHONE ENCOUNTER
Spoke with Monique Naik who states she still does not want to schedule appointment as of right now, but she will call as soon as they are ready  No further action    Malik Mitchell MA

## 2021-07-07 NOTE — TELEPHONE ENCOUNTER
Spoke with Rachael Cruz on the phone to schedule AWV, stated that him and Devang Omalley are feeling under the weather  They will call back when ready to schedule appointment     Dina Cruz, PARVINN

## 2021-12-20 ENCOUNTER — TELEPHONE (OUTPATIENT)
Dept: FAMILY MEDICINE CLINIC | Facility: CLINIC | Age: 86
End: 2021-12-20
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
